# Patient Record
Sex: FEMALE | Race: WHITE | NOT HISPANIC OR LATINO | Employment: UNEMPLOYED | URBAN - METROPOLITAN AREA
[De-identification: names, ages, dates, MRNs, and addresses within clinical notes are randomized per-mention and may not be internally consistent; named-entity substitution may affect disease eponyms.]

---
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
## 2021-01-02 ENCOUNTER — NURSE TRIAGE (OUTPATIENT)
Dept: OTHER | Facility: OTHER | Age: 21
End: 2021-01-02

## 2021-01-02 DIAGNOSIS — Z20.828 SARS-ASSOCIATED CORONAVIRUS EXPOSURE: Primary | ICD-10-CM

## 2021-01-02 DIAGNOSIS — Z20.828 SARS-ASSOCIATED CORONAVIRUS EXPOSURE: ICD-10-CM

## 2021-01-02 PROCEDURE — U0003 INFECTIOUS AGENT DETECTION BY NUCLEIC ACID (DNA OR RNA); SEVERE ACUTE RESPIRATORY SYNDROME CORONAVIRUS 2 (SARS-COV-2) (CORONAVIRUS DISEASE [COVID-19]), AMPLIFIED PROBE TECHNIQUE, MAKING USE OF HIGH THROUGHPUT TECHNOLOGIES AS DESCRIBED BY CMS-2020-01-R: HCPCS | Performed by: FAMILY MEDICINE

## 2021-01-02 NOTE — TELEPHONE ENCOUNTER
Reason for Disposition   [1] COVID-19 infection suspected by caller or triager AND [2] mild symptoms (cough, fever, or others) AND [7] no complications or SOB    Answer Assessment - Initial Assessment Questions  1  COVID-19 DIAGNOSIS: "Who made your Coronavirus (COVID-19) diagnosis?" "Was it confirmed by a positive lab test?" If not diagnosed by a HCP, ask "Are there lots of cases (community spread) where you live?" (See public health department website, if unsure)      Not diagnosed  2  COVID-19 EXPOSURE: "Was there any known exposure to COVID before the symptoms began?" [facility] Definition of close contact: within 6 feet (2 meters) for a total of 15 minutes or more over a 24-hour period  Yes  3  ONSET: "When did the COVID-19 symptoms start?"       3 days ago  4  WORST SYMPTOM: "What is your worst symptom?" (e g , cough, fever, shortness of breath, muscle aches)      Loss of taste and smell  5  COUGH: "Do you have a cough?" If so, ask: "How bad is the cough?"        Yes  6  FEVER: "Do you have a fever?" If so, ask: "What is your temperature, how was it measured, and when did it start?"      No  7  RESPIRATORY STATUS: "Describe your breathing?" (e g , shortness of breath, wheezing, unable to speak)       Normal  8  BETTER-SAME-WORSE: "Are you getting better, staying the same or getting worse compared to yesterday?"  If getting worse, ask, "In what way?"      Same  9  HIGH RISK DISEASE: "Do you have any chronic medical problems?" (e g , asthma, heart or lung disease, weak immune system, obesity, etc )      No  10  PREGNANCY: "Is there any chance you are pregnant?" "When was your last menstrual period?"        No  11   OTHER SYMPTOMS: "Do you have any other symptoms?"  (e g , chills, fatigue, headache, loss of smell or taste, muscle pain, sore throat; new loss of smell or taste especially support the diagnosis of COVID-19)        Stuffy nose    Protocols used: CORONAVIRUS (COVID-19) DIAGNOSED OR SUSPECTED-ADULT-AH

## 2021-01-02 NOTE — TELEPHONE ENCOUNTER
Regarding: COVID symptomatic, exposure   ----- Message from [facility] sent at 1/2/2021 10:25 AM EST -----  Lost taste and smell, stuffy nose  Exposed to someone that has Tiny

## 2021-01-05 ENCOUNTER — TELEPHONE (OUTPATIENT)
Dept: UROLOGY | Facility: CLINIC | Age: 21
End: 2021-01-05

## 2021-01-05 ENCOUNTER — TELEPHONE (OUTPATIENT)
Dept: OTHER | Facility: OTHER | Age: 21
End: 2021-01-05

## 2021-01-05 LAB — SARS-COV-2 RNA SPEC QL NAA+PROBE: DETECTED

## 2021-01-05 NOTE — TELEPHONE ENCOUNTER
Attempt 1:    The patient was called for notification of a test result for COVID-19  The patient did not answer the phone and a voicemail was left requesting a call back to [phone], Option 7  POD 3 after C4IMA  doing well  pt in SR, on amio  cont ASA, bb  cont diuresis (negative 600cc/24hrs)  start SQH  Cont pulmonary toilet, Chest PT, pain control, Incentive spirometry  OOB ambulate  case d/w CTU team

## 2021-01-05 NOTE — TELEPHONE ENCOUNTER
The patient was called for notification of a test result for COVID-19  The patient did not answer the phone and a voicemail was left requesting a call back to [phone], Option 7

## 2021-01-07 NOTE — TELEPHONE ENCOUNTER
3rd attempt  My has an active MyChart  Pt removed from results queue  The patient was called for notification of a test result for COVID-19  The patient did not answer the phone and a voicemail was left requesting a call back to [phone], Option 7

## 2021-01-10 ENCOUNTER — DOCUMENTATION (OUTPATIENT)
Dept: URGENT CARE | Facility: CLINIC | Age: 21
End: 2021-01-10

## 2021-01-10 NOTE — PROGRESS NOTES
Patient called requesting note for work status post coronavirus infection  Patient has been asymptomatic for over 3 days  She has past 10 day quarantine based on CDC guidelines  She is able to go back to work  Note will be entered into her my chart for her

## 2021-01-10 NOTE — LETTER
January 10, 2021         Patient: Ele Galeano   YOB: 2000              Ele Galeano is able to proceed back to work on 01/11/2020  If you have any questions or concerns, please don't hesitate to call             Sincerely,        Javi Reynolds PA-C

## 2021-04-06 ENCOUNTER — IMMUNIZATIONS (OUTPATIENT)
Dept: FAMILY MEDICINE CLINIC | Facility: HOSPITAL | Age: 21
End: 2021-04-06

## 2021-04-06 DIAGNOSIS — Z23 ENCOUNTER FOR IMMUNIZATION: Primary | ICD-10-CM

## 2021-04-06 PROCEDURE — 91300 SARS-COV-2 / COVID-19 MRNA VACCINE (PFIZER-BIONTECH) 30 MCG: CPT

## 2021-04-06 PROCEDURE — 0001A SARS-COV-2 / COVID-19 MRNA VACCINE (PFIZER-BIONTECH) 30 MCG: CPT

## 2021-04-27 ENCOUNTER — IMMUNIZATIONS (OUTPATIENT)
Dept: FAMILY MEDICINE CLINIC | Facility: HOSPITAL | Age: 21
End: 2021-04-27

## 2021-04-27 DIAGNOSIS — Z23 ENCOUNTER FOR IMMUNIZATION: Primary | ICD-10-CM

## 2021-04-27 PROCEDURE — 0002A SARS-COV-2 / COVID-19 MRNA VACCINE (PFIZER-BIONTECH) 30 MCG: CPT

## 2021-04-27 PROCEDURE — 91300 SARS-COV-2 / COVID-19 MRNA VACCINE (PFIZER-BIONTECH) 30 MCG: CPT

## 2021-04-30 ENCOUNTER — OFFICE VISIT (OUTPATIENT)
Dept: FAMILY MEDICINE CLINIC | Facility: CLINIC | Age: 21
End: 2021-04-30
Payer: COMMERCIAL

## 2021-04-30 ENCOUNTER — APPOINTMENT (OUTPATIENT)
Dept: LAB | Facility: CLINIC | Age: 21
End: 2021-04-30
Payer: COMMERCIAL

## 2021-04-30 ENCOUNTER — TRANSCRIBE ORDERS (OUTPATIENT)
Dept: LAB | Facility: CLINIC | Age: 21
End: 2021-04-30

## 2021-04-30 VITALS
HEART RATE: 90 BPM | DIASTOLIC BLOOD PRESSURE: 80 MMHG | TEMPERATURE: 98.3 F | WEIGHT: 135 LBS | BODY MASS INDEX: 24.84 KG/M2 | OXYGEN SATURATION: 98 % | HEIGHT: 62 IN | SYSTOLIC BLOOD PRESSURE: 118 MMHG

## 2021-04-30 DIAGNOSIS — Z00.00 ANNUAL PHYSICAL EXAM: Primary | ICD-10-CM

## 2021-04-30 DIAGNOSIS — Z11.1 SCREENING FOR TUBERCULOSIS: ICD-10-CM

## 2021-04-30 DIAGNOSIS — Z01.84 IMMUNITY STATUS TESTING: ICD-10-CM

## 2021-04-30 LAB
HBV SURFACE AB SER-ACNC: 41.16 MIU/ML
RUBV IGG SERPL IA-ACNC: 22.7 IU/ML

## 2021-04-30 PROCEDURE — 86735 MUMPS ANTIBODY: CPT

## 2021-04-30 PROCEDURE — 36415 COLL VENOUS BLD VENIPUNCTURE: CPT

## 2021-04-30 PROCEDURE — 86787 VARICELLA-ZOSTER ANTIBODY: CPT

## 2021-04-30 PROCEDURE — 99385 PREV VISIT NEW AGE 18-39: CPT | Performed by: FAMILY MEDICINE

## 2021-04-30 PROCEDURE — 86765 RUBEOLA ANTIBODY: CPT

## 2021-04-30 PROCEDURE — 3725F SCREEN DEPRESSION PERFORMED: CPT | Performed by: FAMILY MEDICINE

## 2021-04-30 PROCEDURE — 86580 TB INTRADERMAL TEST: CPT | Performed by: FAMILY MEDICINE

## 2021-04-30 PROCEDURE — 3008F BODY MASS INDEX DOCD: CPT | Performed by: FAMILY MEDICINE

## 2021-04-30 PROCEDURE — 86706 HEP B SURFACE ANTIBODY: CPT

## 2021-04-30 PROCEDURE — 86762 RUBELLA ANTIBODY: CPT

## 2021-04-30 NOTE — ASSESSMENT & PLAN NOTE
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
## 2021-05-01 LAB — VZV IGM SER IA-ACNC: <0.91 INDEX (ref 0–0.9)

## 2021-05-03 ENCOUNTER — CLINICAL SUPPORT (OUTPATIENT)
Dept: FAMILY MEDICINE CLINIC | Facility: CLINIC | Age: 21
End: 2021-05-03

## 2021-05-03 DIAGNOSIS — Z11.1 SCREENING FOR TUBERCULOSIS: Primary | ICD-10-CM

## 2021-05-03 LAB
INDURATION: 0 MM
TB SKIN TEST: NEGATIVE

## 2021-05-04 LAB
MEV IGG SER QL: NORMAL
MUV IGG SER QL: ABNORMAL
VZV IGG SER IA-ACNC: NORMAL

## 2021-05-06 PROBLEM — Z11.3 SCREEN FOR STD (SEXUALLY TRANSMITTED DISEASE): Status: RESOLVED | Noted: 2020-01-14 | Resolved: 2021-05-06

## 2021-05-06 NOTE — PATIENT INSTRUCTIONS
Safe Sex Practices   WHAT YOU NEED TO KNOW:   What are safe sex practices? Safe sex practices are ways to prevent pregnancy and the spread of sexually transmitted infections (STIs)  An STI happens when a virus or bacteria are spread through sexual activity  Safe sex practices help decrease or prevent body fluid exchange during sex  Body fluids include saliva, urine, blood, vaginal fluids, and semen  Oral, vaginal, and anal sex can all spread STIs  What are some safe sex practices to follow before I have sex? · Talk to a new partner before you have sex  Tell your partner if you have an STI  Ask about his or her sex history and if he or she has a current or past STI  Your partner may need to be tested and treated  Do not have sex while you are being treated for an STI, or with a partner who is being treated  · Limit your number of sex partners  More than one sex partner can increase your risk for an STI  Do not have sex with anyone whose sex history you do not know  · Get tested for STIs if needed  Get tested if you had sex with someone who has an STI  Get tested if you have unprotected sex with any new partner  · Talk to your healthcare provider about birth control  Birth control can help prevent an unwanted pregnancy  There are many different types of birth control  Talk to your healthcare provider about which birth control method is right for you  · Ask about medicines to lower your risk for some STIs:      ? Vaccines  can help protect you from hepatitis A, hepatitis B, and the human papillomavirus (HPV)  The HPV vaccine is usually given at 11 years, but it may be given through 26 years to both females and males  Your provider can give you more information on vaccines to prevent STIs  ? Pre-exposure prophylaxis (PrEP)  may be given if you are at high risk for HIV  PrEP is taken every day to prevent the virus from fully infecting the body  · Do not use alcohol or drugs before sex    These can prevent you from thinking clearly and increase your risk for unsafe sex  What are some safe sex practices to follow while I am having sex? · Use condoms and barrier methods for all types of sexual contact  This includes oral, vaginal, and anal sex  Male and female condoms are available  Make sure that the condom fits and is put on correctly  Rubber latex sheets or dental dams can be used for oral sex  Use a new condom or latex barrier each time you have sex  Use latex condoms, if possible  Lambskin or natural condoms do not prevent STIs  If you or your partner is allergic to latex, use a nonlatex product, such as polyurethane  Use a second form of birth control with the condom to prevent pregnancy and STIs  Do not use male and female condoms together  · Only use water-based lubricants during sex  Water-based lubricants help prevent sores or cuts in the vagina or on the penis  Prevent sores or cuts to decrease your risk for an STI  Do not use oil-based lubricants, such as baby oil or hand lotion, with latex condoms or barriers  These will weaken the latex and may cause the condom to break  · Do not use chemicals that irritate your skin  Products that contain chemical irritants, such as spermicides, can irritate the lining of your vagina or rectum  Irritation may cause sores that can increase your risk for an STI  · Be careful when you have sex if you have open sores or cuts  Open sores or cuts may increase your risk for an STI  Keep all open sores or cuts covered during sex  Do not have oral sex if you have cuts or sores in your mouth  · Do not do activities that can pass germs  Do not use saliva as a lubricant or share sex toys  Where can I find more information? · [id number] [address]y (VENU)  P O  [address] [facility] , [address] [place] [address]  Web Address: [email or web address] [email or web address]/  org    When should I seek immediate care?    · A condom breaks, leaks, or slips off while you are having sex  · You notice sores on your penis, vagina, anal area, or the skin around them  · You have had unsafe sex and want to discuss emergency contraception or treatment for STI exposure  When should I call my doctor? · You think you or your female sex partner might be pregnant  · You have questions or concerns about your condition or care  CARE AGREEMENT:   You have the right to help plan your care  Learn about your health condition and how it may be treated  Discuss treatment options with your healthcare providers to decide what care you want to receive  You always have the right to refuse treatment  The above information is an  only  It is not intended as medical advice for individual conditions or treatments  Talk to your doctor, nurse or pharmacist before following any medical regimen to see if it is safe and effective for you  © Copyright [address] Information is for End User's use only and may not be sold, redistributed or otherwise used for commercial purposes   All illustrations and images included in CareNotes® are the copyrighted property of [employer]  or [employer]

## 2021-05-06 NOTE — PROGRESS NOTES
Assessment/Plan:      Diagnoses and all orders for this visit:    Unprotected sex    Routine screening for STI (sexually transmitted infection)      -reviewed safe sexual practices including consistent condom use  -reviewed all forms of contraception including LARCs  Patient is not interested in a forms of contraception  Is okay with pregnancy  -encouraged to avoid alcohol, tobacco/nicotine and drug use  Encouraged not to drink and drive or drive with others that have been drinking  -will call with results    RTO 3/2022 for annual exam with 1st Pap smear  Subjective:     Patient ID: Shira Barron is a 22 y o  female  HPI G0 here requesting STI testing  LMP 4/20/21  Menses are monthly  lasting 5 days  Is sexually active using condoms inconsistently  for contraception    Denies vaginal discharge, pelvic pain, fever and chills  Admits to new  Partner  Medical history significant for heart murmur and eye surgery  Patient is a nonsmoker    Had Gardasil vaccine  Pap smears to begin at age 25  Last gonorrhea/chlamydia 1/14/20 negative    Review of Systems   Constitutional: Negative for chills, fatigue and fever  Respiratory: Negative  Cardiovascular: Negative  Genitourinary: Negative  Neurological: Negative for headaches  Objective:  /83   Pulse 93   Ht 5' 1 5" (1 562 m)   Wt 61 7 kg (136 lb)   LMP 04/20/2021   Breastfeeding No   BMI 25 28 kg/m²        Physical Exam  Vitals signs reviewed  Constitutional:       Appearance: Normal appearance  Cardiovascular:      Rate and Rhythm: Normal rate and regular rhythm  Heart sounds: Murmur present  Pulmonary:      Effort: Pulmonary effort is normal       Breath sounds: Normal breath sounds  Neurological:      Mental Status: She is alert and oriented to person, place, and time     Psychiatric:         Mood and Affect: Mood normal          Behavior: Behavior normal

## 2021-05-10 ENCOUNTER — OFFICE VISIT (OUTPATIENT)
Dept: OBGYN CLINIC | Facility: CLINIC | Age: 21
End: 2021-05-10

## 2021-05-10 ENCOUNTER — CLINICAL SUPPORT (OUTPATIENT)
Dept: FAMILY MEDICINE CLINIC | Facility: CLINIC | Age: 21
End: 2021-05-10
Payer: COMMERCIAL

## 2021-05-10 VITALS
BODY MASS INDEX: 25.03 KG/M2 | SYSTOLIC BLOOD PRESSURE: 130 MMHG | WEIGHT: 136 LBS | HEART RATE: 93 BPM | DIASTOLIC BLOOD PRESSURE: 83 MMHG | HEIGHT: 62 IN

## 2021-05-10 DIAGNOSIS — Z72.51 UNPROTECTED SEX: ICD-10-CM

## 2021-05-10 DIAGNOSIS — Z11.3 ROUTINE SCREENING FOR STI (SEXUALLY TRANSMITTED INFECTION): Primary | ICD-10-CM

## 2021-05-10 DIAGNOSIS — Z11.1 SCREENING FOR TUBERCULOSIS: Primary | ICD-10-CM

## 2021-05-10 PROBLEM — Z00.00 ANNUAL PHYSICAL EXAM: Status: RESOLVED | Noted: 2021-04-30 | Resolved: 2021-05-10

## 2021-05-10 PROBLEM — Z30.42 ENCOUNTER FOR MANAGEMENT AND INJECTION OF DEPO-PROVERA: Status: RESOLVED | Noted: 2019-01-21 | Resolved: 2021-05-10

## 2021-05-10 PROCEDURE — 87591 N.GONORRHOEAE DNA AMP PROB: CPT | Performed by: NURSE PRACTITIONER

## 2021-05-10 PROCEDURE — 99212 OFFICE O/P EST SF 10 MIN: CPT | Performed by: NURSE PRACTITIONER

## 2021-05-10 PROCEDURE — 3008F BODY MASS INDEX DOCD: CPT | Performed by: FAMILY MEDICINE

## 2021-05-10 PROCEDURE — 3008F BODY MASS INDEX DOCD: CPT | Performed by: NURSE PRACTITIONER

## 2021-05-10 PROCEDURE — 86580 TB INTRADERMAL TEST: CPT

## 2021-05-10 PROCEDURE — 87491 CHLMYD TRACH DNA AMP PROBE: CPT | Performed by: NURSE PRACTITIONER

## 2021-05-10 PROCEDURE — 1036F TOBACCO NON-USER: CPT | Performed by: NURSE PRACTITIONER

## 2021-05-11 ENCOUNTER — TELEPHONE (OUTPATIENT)
Dept: OBGYN CLINIC | Facility: CLINIC | Age: 21
End: 2021-05-11

## 2021-05-11 DIAGNOSIS — A74.9 CHLAMYDIA INFECTION: Primary | ICD-10-CM

## 2021-05-11 LAB
C TRACH DNA SPEC QL NAA+PROBE: POSITIVE
N GONORRHOEA DNA SPEC QL NAA+PROBE: NEGATIVE

## 2021-05-11 RX ORDER — AZITHROMYCIN 500 MG/1
1000 TABLET, FILM COATED ORAL DAILY
Qty: 2 TABLET | Refills: 0 | Status: SHIPPED | OUTPATIENT
Start: 2021-05-11 | End: 2021-05-12

## 2021-05-11 NOTE — TELEPHONE ENCOUNTER
----- Message from Guidefitter sent at 5/11/2021  7:39 AM EDT -----  Please call patient gonorrhea is negative  Chlamydia is positive  Recommend treatment with azithromycin 1g  Patient to call office if she is unable to tolerate medication  Should abstain from intercourse for 7 days after both her and her partner have been treated  She will need a follow up in 3 months       Thank you

## 2021-05-11 NOTE — TELEPHONE ENCOUNTER
Pt called stating she took her medication on an empty stomach and threw up a hour later  Would she need another dose ?

## 2021-05-13 ENCOUNTER — CLINICAL SUPPORT (OUTPATIENT)
Dept: FAMILY MEDICINE CLINIC | Facility: CLINIC | Age: 21
End: 2021-05-13
Payer: COMMERCIAL

## 2021-05-13 DIAGNOSIS — Z23 ENCOUNTER FOR IMMUNIZATION: Primary | ICD-10-CM

## 2021-05-13 DIAGNOSIS — Z11.1 SCREENING FOR TUBERCULOSIS: ICD-10-CM

## 2021-05-13 LAB
INDURATION: 0 MM
TB SKIN TEST: NEGATIVE

## 2021-05-13 PROCEDURE — 90707 MMR VACCINE SC: CPT

## 2021-05-13 PROCEDURE — 90471 IMMUNIZATION ADMIN: CPT

## 2021-08-10 ENCOUNTER — OFFICE VISIT (OUTPATIENT)
Dept: OBGYN CLINIC | Facility: CLINIC | Age: 21
End: 2021-08-10

## 2021-08-10 VITALS
DIASTOLIC BLOOD PRESSURE: 76 MMHG | WEIGHT: 136 LBS | SYSTOLIC BLOOD PRESSURE: 125 MMHG | BODY MASS INDEX: 25.03 KG/M2 | HEART RATE: 78 BPM | HEIGHT: 62 IN

## 2021-08-10 DIAGNOSIS — A74.9 CHLAMYDIA INFECTION: Primary | ICD-10-CM

## 2021-08-10 PROCEDURE — 87491 CHLMYD TRACH DNA AMP PROBE: CPT | Performed by: FAMILY MEDICINE

## 2021-08-10 PROCEDURE — 87591 N.GONORRHOEAE DNA AMP PROB: CPT | Performed by: FAMILY MEDICINE

## 2021-08-10 PROCEDURE — 99213 OFFICE O/P EST LOW 20 MIN: CPT | Performed by: FAMILY MEDICINE
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
## 2021-08-11 LAB
C TRACH DNA SPEC QL NAA+PROBE: NEGATIVE
N GONORRHOEA DNA SPEC QL NAA+PROBE: NEGATIVE

## 2021-10-12 ENCOUNTER — TELEPHONE (OUTPATIENT)
Dept: OTHER | Facility: OTHER | Age: 21
End: 2021-10-12

## 2021-10-12 ENCOUNTER — TELEMEDICINE (OUTPATIENT)
Dept: FAMILY MEDICINE CLINIC | Facility: CLINIC | Age: 21
End: 2021-10-12
Payer: COMMERCIAL

## 2021-10-12 DIAGNOSIS — Z20.822 EXPOSURE TO COVID-19 VIRUS: Primary | ICD-10-CM

## 2021-10-12 PROCEDURE — 99212 OFFICE O/P EST SF 10 MIN: CPT | Performed by: FAMILY MEDICINE

## 2021-10-13 PROCEDURE — U0003 INFECTIOUS AGENT DETECTION BY NUCLEIC ACID (DNA OR RNA); SEVERE ACUTE RESPIRATORY SYNDROME CORONAVIRUS 2 (SARS-COV-2) (CORONAVIRUS DISEASE [COVID-19]), AMPLIFIED PROBE TECHNIQUE, MAKING USE OF HIGH THROUGHPUT TECHNOLOGIES AS DESCRIBED BY CMS-2020-01-R: HCPCS | Performed by: FAMILY MEDICINE

## 2021-10-13 PROCEDURE — U0005 INFEC AGEN DETEC AMPLI PROBE: HCPCS | Performed by: FAMILY MEDICINE

## 2021-12-17 ENCOUNTER — IMMUNIZATIONS (OUTPATIENT)
Dept: FAMILY MEDICINE CLINIC | Facility: HOSPITAL | Age: 21
End: 2021-12-17

## 2021-12-17 DIAGNOSIS — Z23 ENCOUNTER FOR IMMUNIZATION: Primary | ICD-10-CM

## 2021-12-17 PROCEDURE — 91306 COVID-19 MODERNA VACC 0.25 ML BOOSTER: CPT

## 2021-12-17 PROCEDURE — 0064A COVID-19 MODERNA VACC 0.25 ML BOOSTER: CPT

## 2021-12-25 ENCOUNTER — HOSPITAL ENCOUNTER (EMERGENCY)
Facility: HOSPITAL | Age: 21
Discharge: HOME | End: 2021-12-25
Attending: EMERGENCY MEDICINE
Payer: COMMERCIAL

## 2021-12-25 ENCOUNTER — APPOINTMENT (EMERGENCY)
Dept: RADIOLOGY | Facility: HOSPITAL | Age: 21
End: 2021-12-25
Payer: COMMERCIAL

## 2021-12-25 VITALS
DIASTOLIC BLOOD PRESSURE: 96 MMHG | RESPIRATION RATE: 18 BRPM | BODY MASS INDEX: 25.03 KG/M2 | HEART RATE: 86 BPM | TEMPERATURE: 98.6 F | SYSTOLIC BLOOD PRESSURE: 164 MMHG | HEIGHT: 62 IN | OXYGEN SATURATION: 97 % | WEIGHT: 136 LBS

## 2021-12-25 DIAGNOSIS — M54.2 NECK PAIN: ICD-10-CM
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
## 2021-12-25 DIAGNOSIS — M54.9 BACK PAIN: ICD-10-CM

## 2021-12-25 PROCEDURE — 99284 EMERGENCY DEPT VISIT MOD MDM: CPT | Performed by: PHYSICIAN ASSISTANT

## 2021-12-25 PROCEDURE — 72040 X-RAY EXAM NECK SPINE 2-3 VW: CPT

## 2021-12-25 PROCEDURE — 72100 X-RAY EXAM L-S SPINE 2/3 VWS: CPT

## 2021-12-25 PROCEDURE — 99284 EMERGENCY DEPT VISIT MOD MDM: CPT

## 2021-12-25 RX ORDER — NAPROXEN 250 MG/1
500 TABLET ORAL ONCE
Status: COMPLETED | OUTPATIENT
Start: 2021-12-25 | End: 2021-12-25

## 2021-12-25 RX ORDER — NAPROXEN 500 MG/1
500 TABLET ORAL 2 TIMES DAILY WITH MEALS
Qty: 30 TABLET | Refills: 0 | Status: SHIPPED | OUTPATIENT
Start: 2021-12-25

## 2021-12-25 RX ORDER — METHOCARBAMOL 500 MG/1
500 TABLET, FILM COATED ORAL 2 TIMES DAILY
Qty: 20 TABLET | Refills: 0 | Status: SHIPPED | OUTPATIENT
Start: 2021-12-25

## 2021-12-25 RX ORDER — ACETAMINOPHEN 325 MG/1
650 TABLET ORAL ONCE
Status: COMPLETED | OUTPATIENT
Start: 2021-12-25 | End: 2021-12-25

## 2021-12-25 RX ORDER — METHOCARBAMOL 500 MG/1
500 TABLET, FILM COATED ORAL ONCE
Status: COMPLETED | OUTPATIENT
Start: 2021-12-25 | End: 2021-12-25

## 2021-12-25 RX ADMIN — ACETAMINOPHEN 650 MG: 325 TABLET, FILM COATED ORAL at 19:39

## 2021-12-25 RX ADMIN — METHOCARBAMOL 500 MG: 500 TABLET ORAL at 19:38

## 2021-12-25 RX ADMIN — NAPROXEN 500 MG: 250 TABLET ORAL at 19:39
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
## 2022-01-17 NOTE — PROGRESS NOTES
Assessment      Very low risk of STD exposure    Declines contraception, OK if she got pregnant, recommend daily multivitamin with folic acid    Plan   RTO 3/28/2022 for annual and 1st pap smear  Discussed safe sexual practice in detail   Culture for GC and cT sent  Pt to complete HIV, RPR and Hep b sag, will call results    Subjective    Kamila Gambino is a 22 y o  female who presents for sexually transmitted disease check  Sexual history reviewed with the patient  STI Exposure: denies knowledge of risky exposure  She is with the same partner, just wants to be checked  Previous history of STI chlamydia  Current symptoms none  Her last culture for [facility] and [facility] was 8/10/2021 and was negative  Hx of positive chlamydia 5/10/2021  Contraception: none, OK if she got pregnant  Menstrual History:  OB History        1    Para        Term                AB   1    Living           SAB        IAB   1    Ectopic        Multiple        Live Births                      Patient's last menstrual period was 2022  Period Cycle (Days): 30  Period Duration (Days): 3-4  Period Pattern: Regular  Dysmenorrhea: (!) Mild    The following portions of the patient's history were reviewed and updated as appropriate: allergies, current medications, past family history, past medical history, past social history, past surgical history and problem list     Review of Systems  Pertinent items are noted in HPI          Objective      /81 (BP Location: Left arm, Patient Position: Sitting, Cuff Size: Adult)   Pulse 88   Ht 5' 2" (1 575 m)   Wt 62 6 kg (138 lb)   LMP 2022   BMI 25 24 kg/m²     General:   alert and oriented, in no acute distress   Heart:    Lungs:    Abdomen: soft, non-tender, without masses or organomegaly, soft and nontender   Vulva: Pelvic exam Deferred denies symptoms   Vagina:    Cervix:    Uterus:    Adnexa:    Lymph Nodes:     Cultures: GC and Chlamydia genprobes via urine

## 2022-01-18 ENCOUNTER — OFFICE VISIT (OUTPATIENT)
Dept: OBGYN CLINIC | Facility: CLINIC | Age: 22
End: 2022-01-18

## 2022-01-18 VITALS
HEIGHT: 62 IN | BODY MASS INDEX: 25.4 KG/M2 | HEART RATE: 88 BPM | DIASTOLIC BLOOD PRESSURE: 81 MMHG | SYSTOLIC BLOOD PRESSURE: 125 MMHG | WEIGHT: 138 LBS

## 2022-01-18 DIAGNOSIS — Z20.2 POSSIBLE EXPOSURE TO STD: Primary | ICD-10-CM

## 2022-01-18 PROCEDURE — 99213 OFFICE O/P EST LOW 20 MIN: CPT | Performed by: NURSE PRACTITIONER

## 2022-01-18 PROCEDURE — 87491 CHLMYD TRACH DNA AMP PROBE: CPT | Performed by: NURSE PRACTITIONER

## 2022-01-18 PROCEDURE — 87591 N.GONORRHOEAE DNA AMP PROB: CPT | Performed by: NURSE PRACTITIONER

## 2022-01-20 ENCOUNTER — TELEPHONE (OUTPATIENT)
Dept: OBGYN CLINIC | Facility: CLINIC | Age: 22
End: 2022-01-20

## 2022-01-20 LAB
C TRACH DNA SPEC QL NAA+PROBE: NEGATIVE
N GONORRHOEA DNA SPEC QL NAA+PROBE: NEGATIVE

## 2022-01-20 NOTE — TELEPHONE ENCOUNTER
Message left for patient that results are available in [facility]  Encouraged to call office with any questions/concerns

## 2022-01-20 NOTE — TELEPHONE ENCOUNTER
----- Message from Ashley Valdes, [address] sent at 1/20/2022  8:18 AM EST -----   Culture Result is negative, please call patient to inform    Thanks

## 2022-03-28 ENCOUNTER — TELEPHONE (OUTPATIENT)
Dept: OBGYN CLINIC | Facility: CLINIC | Age: 22
End: 2022-03-28

## 2022-04-19 ENCOUNTER — ANNUAL EXAM (OUTPATIENT)
Dept: OBGYN CLINIC | Facility: CLINIC | Age: 22
End: 2022-04-19

## 2022-04-19 VITALS
DIASTOLIC BLOOD PRESSURE: 80 MMHG | WEIGHT: 134.2 LBS | BODY MASS INDEX: 24.69 KG/M2 | SYSTOLIC BLOOD PRESSURE: 128 MMHG | HEART RATE: 83 BPM | HEIGHT: 62 IN

## 2022-04-19 DIAGNOSIS — Z11.3 SCREENING FOR STDS (SEXUALLY TRANSMITTED DISEASES): ICD-10-CM

## 2022-04-19 DIAGNOSIS — Z01.419 ENCOUNTER FOR GYNECOLOGICAL EXAMINATION WITHOUT ABNORMAL FINDING: Primary | ICD-10-CM

## 2022-04-19 PROCEDURE — 1036F TOBACCO NON-USER: CPT | Performed by: NURSE PRACTITIONER

## 2022-04-19 PROCEDURE — 3008F BODY MASS INDEX DOCD: CPT | Performed by: NURSE PRACTITIONER

## 2022-04-19 PROCEDURE — 0503F POSTPARTUM CARE VISIT: CPT | Performed by: NURSE PRACTITIONER

## 2022-04-19 PROCEDURE — 99395 PREV VISIT EST AGE 18-39: CPT | Performed by: NURSE PRACTITIONER

## 2022-04-19 PROCEDURE — 87591 N.GONORRHOEAE DNA AMP PROB: CPT | Performed by: NURSE PRACTITIONER

## 2022-04-19 PROCEDURE — 87491 CHLMYD TRACH DNA AMP PROBE: CPT | Performed by: NURSE PRACTITIONER

## 2022-04-19 PROCEDURE — G0145 SCR C/V CYTO,THINLAYER,RESCR: HCPCS | Performed by: NURSE PRACTITIONER

## 2022-04-19 NOTE — PROGRESS NOTES
ASSESSMENT & PLAN: Mickey Soto is a 25 y o  [place] Kaity with normal gynecologic exam     1   Routine well woman exam done today  2  Pap:  The patient's last pap was 1st Pap to be done today  Pap was done today  Current ASCCP Guidelines reviewed  3   STD testing - culture for chlamydia and gonorrhea done today declines other testing  4  Gardasil recommendations reviewed  is vaccinated  5  The following were reviewed in today's visit: breast self exam, STD testing, HIV risk factors and prevention, family planning choices, adequate intake of calcium and vitamin D, exercise and healthy diet  She has STD blood work that she needs to complete  6  RTO if desires contraception  Reviewed daily multivitamin with folic acid  Depression Screening Follow-up Plan: Patient's depression screening was positive with a PHQ-2 score of   Their PHQ-9 score was   Clinically patient does not have depression  No treatment is required  CC:  Annual Gynecologic Examination    HPI: Mickey Soto is a 25 y o  [place] Kaity who presents for annual gynecologic examination  She is due for her 1st Pap today  She has had Gardasil vaccine  Her last culture for chlamydia and gonorrhea was done 2022 and was negative  She does have history of positive chlamydia test 5/10/2021  Wants culture for GC and cT today  Health Maintenance:    She wears her seatbelt routinely  She does perform irregular monthly self breast exams  She feels safe at home  Past Medical History:   Diagnosis Date    Heart murmur        Past Surgical History:   Procedure Laterality Date    EYE SURGERY      stye removal       OB/Gyn History:    Pt does not have menstrual issues  regular    History of sexually transmitted infection: Yes  History of abnormal pap smears: No   1st pap done today       Patient is currently sexually active      The current method of family planning is condoms    OB History        1    Para        Term         AB   1    Living           SAB        IAB   1    Ectopic        Multiple        Live Births                     Family History   Problem Relation Age of Onset    No Known Problems Mother     No Known Problems Father     No Known Problems Sister     No Known Problems Brother     No Known Problems Maternal Grandmother     No Known Problems Maternal Grandfather     No Known Problems Paternal Grandmother     No Known Problems Paternal Grandfather     Diabetes Sister     No Known Problems Brother     Asthma Brother     Breast cancer Neg Hx     Colon cancer Neg Hx     Ovarian cancer Neg Hx        Social History:  Social History     Socioeconomic History    Marital status: Single     Spouse name: Not on file    Number of children: Not on file    Years of education: Not on file    Highest education level: Not on file   Occupational History    Not on file   Tobacco Use    Smoking status: Never Smoker    Smokeless tobacco: Never Used   Vaping Use    Vaping Use: Never used   Substance and Sexual Activity    Alcohol use: Not Currently    Drug use: No    Sexual activity: Yes     Birth control/protection: None     Comment: pt cell phone number is [phone]   Other Topics Concern    Not on file   Social History Narrative    Not on file     Social Determinants of Health     Financial Resource Strain: Not on file   Food Insecurity: Not on file   Transportation Needs: Not on file   Physical Activity: Not on file   Stress: Not on file   Social Connections: Not on file   Intimate Partner Violence: Not on file   Housing Stability: Not on file     Patient is single    Patient is currently employed  In [employer] school    Allergies   Allergen Reactions    Shrimp (Diagnostic) - Food Allergy Lip Swelling         Current Outpatient Medications:     methocarbamol (ROBAXIN) 500 mg tablet, Take 1 tablet (500 mg total) by mouth 2 (two) times a day (Patient not taking: Reported on 2022 ), Disp: 20 tablet, Rfl: 0    naproxen (Naprosyn) 500 mg tablet, Take 1 tablet (500 mg total) by mouth 2 (two) times a day with meals (Patient not taking: Reported on 1/18/2022 ), Disp: 30 tablet, Rfl: 0    Current Facility-Administered Medications:     EPINEPHrine (EPIPEN) injection 0 3 mg, 0 3 mg, Intramuscular, Once, Jermaine Coyne PA-C    Review of Systems:  Constitutional :no fever, feels well, no tiredness, no recent weight gain or loss  ENT: no ear ache, no loss of hearing, no nosebleeds or nasal discharge, no sore throat or hoarseness  Cardiovascular: no complaints of slow or fast heart beat, no chest pain, no palpitations, no leg claudication or lower extremity edema  Respiratory: no complaints of shortness of shortness of breath, no TERAN  Breasts:no complaints of breast pain, breast lump, or nipple discharge  Gastrointestinal: no complaints of abdominal pain, constipation, nausea, vomiting, or diarrhea or bloody stools  Genitourinary : no complaints of dysuria, incontinence, pelvic pain, no dysmenorrhea, vaginal discharge or abnormal vaginal bleeding and as noted in HPI  Musculoskeletal: no complaints of arthralgia, no myalgia, no joint swelling or stiffness, no limb pain or swelling  Integumentary: no complaints of skin rash or lesion, itching or dry skin  Neurological: no complaints of headache, no confusion, no numbness or tingling, no dizziness or fainting    Objective      There were no vitals taken for this visit      General:   appears stated age, cooperative, alert normal mood and affect   Neck: normal, supple,trachea midline, no masses   Heart: regular rate and rhythm, S1, S2 normal, no murmur, click, rub or gallop   Lungs: clear to auscultation bilaterally   Breasts: normal appearance, no masses or tenderness, Inspection negative, No nipple retraction or dimpling, No nipple discharge or bleeding, No axillary or supraclavicular adenopathy, Normal to palpation without dominant masses, Taught monthly breast self examination, bilateral nipple piercing  Abdomen: soft, non-tender, without masses or organomegaly   Vulva: normal female genitalia, Bartholin's, Urethra, [place] normal   Vagina: normal vagina, no discharge, exudate, lesion, or erythema   Urethra: normal   Cervix: Normal, no discharge  PAP done  GCC done  Nontender  Uterus: normal size, contour, position, consistency, mobility, non-tender   Adnexa: normal adnexa and no mass, fullness, tenderness   Lymphatic palpation of lymph nodes in neck, axilla, groin and/or other locations: no lymphadenopathy or masses noted   Skin normal skin turgor and no rashes     Psychiatric orientation to person, place, and time: normal  mood and affect: normal

## 2022-04-20 LAB
C TRACH DNA SPEC QL NAA+PROBE: NEGATIVE
N GONORRHOEA DNA SPEC QL NAA+PROBE: NEGATIVE

## 2022-04-22 ENCOUNTER — TELEPHONE (OUTPATIENT)
Dept: OBGYN CLINIC | Facility: CLINIC | Age: 22
End: 2022-04-22

## 2022-04-25 LAB
LAB AP GYN PRIMARY INTERPRETATION: NORMAL
Lab: NORMAL

## 2022-04-26 ENCOUNTER — TELEPHONE (OUTPATIENT)
Dept: OBGYN CLINIC | Facility: CLINIC | Age: 22
End: 2022-04-26

## 2022-04-26 NOTE — TELEPHONE ENCOUNTER
----- Message from Genia Wang, [address] sent at 4/25/2022  9:16 PM EDT -----  Pap is negative, please let pt know  Thanks!

## 2022-06-07 ENCOUNTER — OFFICE VISIT (OUTPATIENT)
Dept: OBGYN CLINIC | Facility: CLINIC | Age: 22
End: 2022-06-07

## 2022-06-07 VITALS
HEART RATE: 78 BPM | HEIGHT: 62 IN | DIASTOLIC BLOOD PRESSURE: 73 MMHG | SYSTOLIC BLOOD PRESSURE: 112 MMHG | WEIGHT: 135.8 LBS | BODY MASS INDEX: 24.99 KG/M2

## 2022-06-07 DIAGNOSIS — Z30.09 ENCOUNTER FOR COUNSELING REGARDING CONTRACEPTION: Primary | ICD-10-CM

## 2022-06-07 DIAGNOSIS — Z20.2 POSSIBLE EXPOSURE TO STD: ICD-10-CM

## 2022-06-07 LAB — SL AMB POCT URINE HCG: NEGATIVE

## 2022-06-07 PROCEDURE — 3008F BODY MASS INDEX DOCD: CPT | Performed by: NURSE PRACTITIONER

## 2022-06-07 PROCEDURE — 87491 CHLMYD TRACH DNA AMP PROBE: CPT | Performed by: NURSE PRACTITIONER

## 2022-06-07 PROCEDURE — 1036F TOBACCO NON-USER: CPT | Performed by: NURSE PRACTITIONER

## 2022-06-07 PROCEDURE — 99213 OFFICE O/P EST LOW 20 MIN: CPT | Performed by: NURSE PRACTITIONER

## 2022-06-07 PROCEDURE — 81025 URINE PREGNANCY TEST: CPT | Performed by: NURSE PRACTITIONER

## 2022-06-07 PROCEDURE — 87591 N.GONORRHOEAE DNA AMP PROB: CPT | Performed by: NURSE PRACTITIONER

## 2022-06-07 RX ORDER — MEDROXYPROGESTERONE ACETATE 150 MG/ML
150 INJECTION, SUSPENSION INTRAMUSCULAR
Qty: 1 ML | Refills: 3 | Status: SHIPPED | OUTPATIENT
Start: 2022-06-07 | End: 2023-04-07

## 2022-06-07 NOTE — PROGRESS NOTES
Assessment/Plan:    No problem-specific Assessment & Plan notes found for this encounter  Annual due 04/19/2023     Diagnoses and all orders for this visit:    Encounter for counseling regarding contraception  -     medroxyPROGESTERone (DEPO-PROVERA) 150 mg/mL injection; Inject 1 mL (150 mg total) into a muscle every 3 (three) months  Patient to  Depo and return to office with injection  Return to office 06/09/2022 for her injection, reviewed backup method x7 days  Possible exposure to STD  Condom use was recommended  Will call results to patient  Culture for chlamydia and gonorrhea  Declines other STD testing at this time          Subjective:      Patient ID: Vanesa Rocha is a 25 y o  female  HPI patient here for STD check and contraceptive consultation  Patient currently sexually active with new partner, has not been using contraception or condoms  Her LMP was 05/28/2022, has not had sexual relations since her last menses  Reviewed all options and patient would like to restart Depo-Provera  Last used about 1 year ago  She did have weight gain her previous injections  Reviewed diet exercise  Reviewed calcium and vitamin-D for bone health  Aware of irregular bleeding pattern and that 50% of women will have amenorrhea at the end of the 1st year of use  History of positive chlamydia 05/10/2021  Last culture done 04/09/2022 and was negative  Recommended condom use with sexual activity      She has had Gardasil vaccines  Pap done 04/19/2022 was negative    The following portions of the patient's history were reviewed and updated as appropriate: allergies, current medications, past family history, past medical history, past social history, past surgical history and problem list     Review of Systems   Constitutional: Negative for chills and fever  Eyes: Negative for photophobia and visual disturbance  Respiratory: Negative  Cardiovascular: Negative      Genitourinary: Negative for dysuria, menstrual problem and vaginal discharge  Neurological: Negative for dizziness and headaches  Objective:      /73 (BP Location: Left arm, Patient Position: Sitting, Cuff Size: Adult)   Pulse 78   Ht 5' 2" (1 575 m)   Wt 61 6 kg (135 lb 12 8 oz)   LMP 05/28/2022   BMI 24 84 kg/m²          Physical Exam  Constitutional:       Appearance: Normal appearance  Cardiovascular:      Rate and Rhythm: Normal rate  Pulmonary:      Effort: Pulmonary effort is normal    Abdominal:      Palpations: Abdomen is soft  Tenderness: There is no abdominal tenderness  Skin:     General: Skin is warm and dry  Neurological:      Mental Status: She is oriented to person, place, and time     Psychiatric:         Mood and Affect: Mood normal          Behavior: Behavior normal

## 2022-06-09 ENCOUNTER — CLINICAL SUPPORT (OUTPATIENT)
Dept: OBGYN CLINIC | Facility: CLINIC | Age: 22
End: 2022-06-09

## 2022-06-09 VITALS — HEIGHT: 62 IN | BODY MASS INDEX: 24.84 KG/M2

## 2022-06-09 DIAGNOSIS — Z30.42 ENCOUNTER FOR DEPO-PROVERA CONTRACEPTION: Primary | ICD-10-CM

## 2022-06-09 PROCEDURE — 96372 THER/PROPH/DIAG INJ SC/IM: CPT

## 2022-06-09 RX ORDER — MEDROXYPROGESTERONE ACETATE 150 MG/ML
150 INJECTION, SUSPENSION INTRAMUSCULAR
Status: SHIPPED | OUTPATIENT
Start: 2022-06-09

## 2022-06-09 RX ADMIN — MEDROXYPROGESTERONE ACETATE 150 MG: 150 INJECTION, SUSPENSION INTRAMUSCULAR at 15:26

## 2022-06-09 NOTE — PROGRESS NOTES
Depo-Provera      [x]   Patient provided box YES   o 3 Refills remain  o Refills submitted no   Last  Annual Date / Birth control check : 4/19/2022   Last Depo date: RESTART   Side effects: no   HCG: yes  o if applicable: negative   Given by: Davy Yip RN   Site: Left Deltoid     o Calcium supplement daily teaching  o Condoms for 2 weeks following first injection dose

## 2022-06-10 LAB
C TRACH DNA SPEC QL NAA+PROBE: NEGATIVE
N GONORRHOEA DNA SPEC QL NAA+PROBE: NEGATIVE

## 2022-10-11 PROBLEM — Z11.3 SCREENING FOR STDS (SEXUALLY TRANSMITTED DISEASES): Status: RESOLVED | Noted: 2022-04-19 | Resolved: 2022-10-11

## 2022-11-17 ENCOUNTER — OFFICE VISIT (OUTPATIENT)
Dept: OBGYN CLINIC | Facility: CLINIC | Age: 22
End: 2022-11-17

## 2022-11-17 VITALS
RESPIRATION RATE: 16 BRPM | WEIGHT: 132.8 LBS | HEART RATE: 84 BPM | DIASTOLIC BLOOD PRESSURE: 84 MMHG | BODY MASS INDEX: 24.29 KG/M2 | SYSTOLIC BLOOD PRESSURE: 133 MMHG

## 2022-11-17 DIAGNOSIS — Z20.2 POSSIBLE EXPOSURE TO STD: Primary | ICD-10-CM

## 2022-11-17 NOTE — PROGRESS NOTES
Assessment     Diagnoses and all orders for this visit:     Possible Exposure to STD  -     Chlamydia/GC amplified DNA by PCR  Offered bloodwork- pt declines  Will call results to pt       Possible STD exposure        Plan     RTO 2023 for annual    Discussed safe sexual practice in detail       Subjective    Gricelda Centeno is a 25 y o  female who presents for sexually transmitted disease check  Sexual history reviewed with the patient  STI Exposure: denies knowledge of risky exposure  Same partner x 1 year, wants to be checked, declines STD blood work  Previous history of STI chlamydia 2021 Current symptoms none  Contraception: condoms  Does not want to use Depo anymore because of bleeding  Declines other contraception  Menstrual History:  OB History        1    Para        Term                AB   1    Living           SAB        IAB   1    Ectopic        Multiple        Live Births                      Patient's last menstrual period was 2022 (approximate)  The following portions of the patient's history were reviewed and updated as appropriate: allergies, current medications, past family history, past medical history, past social history, past surgical history and problem list     Review of Systems  Pertinent items are noted in HPI          Objective      /84 (BP Location: Right arm, Patient Position: Sitting, Cuff Size: Standard)   Pulse 84   Resp 16   Wt 60 2 kg (132 lb 12 8 oz)   LMP 2022 (Approximate)   BMI 24 29 kg/m²     General:   alert and oriented, in no acute distress   Heart:    Lungs:    Abdomen: soft, non-tender, without masses or organomegaly   Vulva: Deferred, denies concerns   Vagina:    Cervix:    Uterus:    Adnexa:    Lymph Nodes:     Cultures: GC and Chlamydia genprobes

## 2022-11-18 LAB
C TRACH DNA SPEC QL NAA+PROBE: NEGATIVE
N GONORRHOEA DNA SPEC QL NAA+PROBE: NEGATIVE

## 2022-11-22 ENCOUNTER — TELEPHONE (OUTPATIENT)
Dept: OBGYN CLINIC | Facility: CLINIC | Age: 22
End: 2022-11-22

## 2022-11-22 NOTE — TELEPHONE ENCOUNTER
Message left for patient that results are available in \[facility]\"" & HEALTH [facility]  Encouraged to call office with any questions/concerns

## 2022-11-22 NOTE — TELEPHONE ENCOUNTER
----- Message from Carmen Sánchez, [address] sent at 11/20/2022  8:57 PM EST -----   Result is negative, please call patient to inform     Thanks

## 2023-04-25 ENCOUNTER — ANNUAL EXAM (OUTPATIENT)
Dept: OBGYN CLINIC | Facility: CLINIC | Age: 23
End: 2023-04-25

## 2023-04-25 VITALS
SYSTOLIC BLOOD PRESSURE: 112 MMHG | HEART RATE: 80 BPM | BODY MASS INDEX: 25.49 KG/M2 | HEIGHT: 61 IN | WEIGHT: 135 LBS | RESPIRATION RATE: 18 BRPM | DIASTOLIC BLOOD PRESSURE: 73 MMHG

## 2023-04-25 DIAGNOSIS — Z20.2 POSSIBLE EXPOSURE TO STD: ICD-10-CM

## 2023-04-25 DIAGNOSIS — Z01.419 ENCOUNTER FOR GYNECOLOGICAL EXAMINATION WITHOUT ABNORMAL FINDING: Primary | ICD-10-CM

## 2023-04-25 NOTE — PROGRESS NOTES
ASSESSMENT & PLAN: Dawn Hutchinson is a 26 y o  [place] with normal gynecologic exam     1   Routine well woman exam done today  2  Pap:  The patient's last pap was 2022  It was normal     Pap was not done today  Current ASCCP Guidelines reviewed  3   STD testing  was done for [facility] and [place] only  4  Gardasil recommendations reviewed  is vaccinated  5  The following were reviewed in today's visit: breast self exam, STD testing, family planning choices, adequate intake of calcium and vitamin D, exercise and healthy diet      Depression Screening Follow-up Plan: Patient's depression screening was negative  with a PHQ-2 score of 0   Their PHQ-9 score was  0  Clinically patient does not have depression  No treatment is required  BMI Counseling: Body mass index is 25 51 kg/m²  The BMI is above normal  Nutrition recommendations include 3-5 servings of fruits/vegetables daily, moderation in carbohydrate intake and reducing intake of cholesterol  Exercise recommendations include exercising 3-5 times per week  CC:  Annual Gynecologic Examination    HPI: Dawn Hutchinson is a 26 y o  [place] who presents for annual gynecologic examination  Previously was on Depo for contraception but stopped use due to bleeding  History of chlamydia on 2021 last culture for chlamydia and gonorrhea done 2022 and was negative      Health Maintenance:    She wears her seatbelt routinely  She does perform irregular monthly self breast exams  She feels safe at home  Past Medical History:   Diagnosis Date   • Heart murmur        Past Surgical History:   Procedure Laterality Date   • EYE SURGERY      stye removal       OB/Gyn History:    Pt does not have menstrual issues  Monthly, x 3 days    History of sexually transmitted infection: Yes  History of abnormal pap smears: No      Patient is currently sexually active      The current method of family planning is condoms     OB History        1    Para        Term         AB   1    Living           SAB        IAB   1    Ectopic        Multiple        Live Births                     Family History   Problem Relation Age of Onset   • No Known Problems Mother    • No Known Problems Father    • No Known Problems Sister    • No Known Problems Brother    • No Known Problems Maternal Grandmother    • No Known Problems Maternal Grandfather    • No Known Problems Paternal Grandmother    • No Known Problems Paternal Grandfather    • Diabetes Sister    • No Known Problems Brother    • Asthma Brother    • Breast cancer Neg Hx    • Colon cancer Neg Hx    • Ovarian cancer Neg Hx        Social History:  Social History     Socioeconomic History   • Marital status: Single     Spouse name: Not on file   • Number of children: Not on file   • Years of education: Not on file   • Highest education level: Not on file   Occupational History   • Not on file   Tobacco Use   • Smoking status: Never   • Smokeless tobacco: Never   Vaping Use   • Vaping Use: Never used   Substance and Sexual Activity   • Alcohol use: Yes     Comment: 2 x a month   • Drug use: No   • Sexual activity: Yes     Birth control/protection: None     Comment: pt cell phone number is [phone]   Other Topics Concern   • Not on file   Social History Narrative   • Not on file     Social Determinants of Health     Financial Resource Strain: Low Risk    • Difficulty of Paying Living Expenses: Not hard at all   Food Insecurity: No Food Insecurity   • Worried About Running Out of Food in the Last Year: Never true   • Ran Out of Food in the Last Year: Never true   Transportation Needs: No Transportation Needs   • Lack of Transportation (Medical): No   • Lack of Transportation (Non-Medical):  No   Physical Activity: Not on file   Stress: Not on file   Social Connections: Not on file   Intimate Partner Violence: Not on file   Housing Stability: Low Risk    • Unable to Pay for Housing in the Last Year: No   • Number of Places Lived in the Last Year: 1   • Unstable Housing in the Last Year: No     Patient is single  Patient is currently employed   Graduates from [employer]  Allergies   Allergen Reactions   • Shrimp (Diagnostic) - Food Allergy Lip Swelling         Current Outpatient Medications:   •  methocarbamol (ROBAXIN) 500 mg tablet, Take 1 tablet (500 mg total) by mouth 2 (two) times a day (Patient not taking: Reported on 1/18/2022), Disp: 20 tablet, Rfl: 0  •  naproxen (Naprosyn) 500 mg tablet, Take 1 tablet (500 mg total) by mouth 2 (two) times a day with meals (Patient not taking: Reported on 1/18/2022), Disp: 30 tablet, Rfl: 0    Current Facility-Administered Medications:   •  EPINEPHrine (EPIPEN) injection 0 3 mg, 0 3 mg, Intramuscular, Once, Jeff Brooks PA-C  •  medroxyPROGESTERone (DEPO-PROVERA) IM injection 150 mg, 150 mg, Intramuscular, Q3 Months, Truong Goodwin MD, 150 mg at 06/09/22 1526    Review of Systems:  Constitutional :no fever, feels well, no tiredness, no recent weight gain or loss  ENT: no ear ache, no loss of hearing, no nosebleeds or nasal discharge, no sore throat or hoarseness  Cardiovascular: no complaints of slow or fast heart beat, no chest pain, no palpitations, no leg claudication or lower extremity edema  Respiratory: no complaints of shortness of shortness of breath, no TERAN  Breasts:no complaints of breast pain, breast lump, or nipple discharge  Gastrointestinal: no complaints of abdominal pain, constipation, nausea, vomiting, or diarrhea or bloody stools  Genitourinary : no complaints of dysuria, incontinence, pelvic pain, no dysmenorrhea, vaginal discharge or abnormal vaginal bleeding and as noted in HPI  Musculoskeletal: no complaints of arthralgia, no myalgia, no joint swelling or stiffness, no limb pain or swelling    Integumentary: no complaints of skin rash or lesion, itching or dry skin  Neurological: no complaints of headache, no confusion, no numbness or tingling, no "dizziness or fainting    Objective      /73 (BP Location: Right arm, Patient Position: Sitting, Cuff Size: Adult)   Pulse 80   Resp 18   Ht 5' 1\" (1 549 m)   Wt 61 2 kg (135 lb)   LMP 04/16/2023 (Exact Date)   BMI 25 51 kg/m²     General:   appears stated age, cooperative, alert normal mood and affect   Neck: normal, supple,trachea midline, no masses   Heart: regular rate and rhythm, S1, S2 normal, no murmur, click, rub or gallop   Lungs: clear to auscultation bilaterally   Breasts: normal appearance, no masses or tenderness, Inspection negative, No nipple retraction or dimpling, No nipple discharge or bleeding, No axillary or supraclavicular adenopathy, Normal to palpation without dominant masses, Taught monthly breast self examination   Abdomen: soft, non-tender, without masses or organomegaly   Vulva: Bartholin's, Urethra, [place] normal   Vagina: normal vagina, no discharge, exudate, lesion, or erythema   Urethra: normal   Cervix: Normal, no discharge  GCC done  Nontender  Uterus: normal size, contour, position, consistency, mobility, non-tender   Adnexa: normal adnexa and no mass, fullness, tenderness   Lymphatic palpation of lymph nodes in neck, axilla, groin and/or other locations: no lymphadenopathy or masses noted   Skin normal skin turgor and no rashes     Psychiatric orientation to person, place, and time: normal  mood and affect: normal         "

## 2023-04-27 LAB
C TRACH DNA SPEC QL NAA+PROBE: NEGATIVE
N GONORRHOEA DNA SPEC QL NAA+PROBE: NEGATIVE

## 2023-04-28 ENCOUNTER — TELEPHONE (OUTPATIENT)
Dept: OBGYN CLINIC | Facility: CLINIC | Age: 23
End: 2023-04-28

## 2023-04-28 NOTE — TELEPHONE ENCOUNTER
----- Message from Pat Robertson, [address] sent at 4/28/2023  8:46 AM EDT -----   Please inform pt that her culture for chlamydia and gonorrhea were negative  Thank You!

## 2023-04-28 NOTE — TELEPHONE ENCOUNTER
Attempted to call, and left a message with test results, ok per communication consent form  Office number provided in message

## 2023-07-09 NOTE — PROGRESS NOTES
Assessment      Possible STD exposure    Diagnoses and all orders for this visit:    Possible exposure to STD  Culture for [facility] and [facility] done today, declines other testing. We will call results when available  Reviewed safe sexual doses when sexually active  Plan     Discussed safe sexual practice in detail   Annual due 2023    Akil Caicedo is a 26 y.o. female who presents for sexually transmitted disease check. Sexual history reviewed with the patient. STI Exposure: denies knowledge of risky exposure. Recently broke up with her boyfriend and wants to be proactive and have testing done. Previous history of STI-  chlamydia. Last culture for [facility] and CT was 23 and was negative. Current symptoms none. Contraception: abstinence, currently not sexually active. Menstrual History:  OB History        1    Para        Term                AB   1    Living           SAB        IAB   1    Ectopic        Multiple        Live Births                      Patient's last menstrual period was 2023. The following portions of the patient's history were reviewed and updated as appropriate: allergies, current medications, past family history, past medical history, past social history, past surgical history and problem list.    Review of Systems  Pertinent items are noted in HPI.         Objective      /80   Pulse 70   Ht 5' 1" (1.549 m)   Wt 60.5 kg (133 lb 6.4 oz)   LMP 2023   BMI 25.21 kg/m²     General:   alert and oriented, in no acute distress   Heart: regular rate and rhythm, S1, S2 normal, no murmur, click, rub or gallop   Lungs: clear to auscultation bilaterally   Abdomen: soft, non-tender, without masses or organomegaly   Vulva: Denies concerns   Vagina:    Cervix:    Uterus:    Adnexa:    Lymph Nodes:     Cultures: GC and Chlamydia genprobes

## 2023-07-10 ENCOUNTER — OFFICE VISIT (OUTPATIENT)
Dept: OBGYN CLINIC | Facility: CLINIC | Age: 23
End: 2023-07-10

## 2023-07-10 VITALS
HEART RATE: 70 BPM | BODY MASS INDEX: 25.19 KG/M2 | HEIGHT: 61 IN | WEIGHT: 133.4 LBS | DIASTOLIC BLOOD PRESSURE: 80 MMHG | SYSTOLIC BLOOD PRESSURE: 118 MMHG

## 2023-07-10 DIAGNOSIS — Z20.2 POSSIBLE EXPOSURE TO STD: Primary | ICD-10-CM

## 2023-07-10 PROCEDURE — 87591 N.GONORRHOEAE DNA AMP PROB: CPT | Performed by: NURSE PRACTITIONER

## 2023-07-10 PROCEDURE — 87491 CHLMYD TRACH DNA AMP PROBE: CPT | Performed by: NURSE PRACTITIONER

## 2023-07-10 PROCEDURE — 99213 OFFICE O/P EST LOW 20 MIN: CPT | Performed by: NURSE PRACTITIONER

## 2023-07-11 LAB
C TRACH DNA SPEC QL NAA+PROBE: NEGATIVE
N GONORRHOEA DNA SPEC QL NAA+PROBE: NEGATIVE

## 2023-07-12 ENCOUNTER — TELEPHONE (OUTPATIENT)
Dept: OBGYN CLINIC | Facility: CLINIC | Age: 23
End: 2023-07-12

## 2023-07-12 NOTE — TELEPHONE ENCOUNTER
----- Message from Primo Eaton, [address] [place] [address] sent at 7/11/2023  5:42 PM EDT -----  Please inform pt that her culture for chlamydia and gonorrhea were negative. Thank You!

## 2023-07-12 NOTE — TELEPHONE ENCOUNTER
Attempted to call, left a message with test results, ok per communication consent form. Office number provided in case of any questions or concerns.

## 2024-02-21 PROBLEM — Z11.3 ROUTINE SCREENING FOR STI (SEXUALLY TRANSMITTED INFECTION): Status: RESOLVED | Noted: 2019-01-21 | Resolved: 2024-02-21

## 2024-02-21 PROBLEM — Z01.419 ENCOUNTER FOR GYNECOLOGICAL EXAMINATION WITHOUT ABNORMAL FINDING: Status: RESOLVED | Noted: 2022-04-19 | Resolved: 2024-02-21

## 2024-05-06 ENCOUNTER — OFFICE VISIT (OUTPATIENT)
Dept: FAMILY MEDICINE CLINIC | Facility: CLINIC | Age: 24
End: 2024-05-06
Payer: COMMERCIAL

## 2024-05-06 VITALS
HEIGHT: 62 IN | DIASTOLIC BLOOD PRESSURE: 80 MMHG | OXYGEN SATURATION: 98 % | WEIGHT: 140.4 LBS | BODY MASS INDEX: 25.83 KG/M2 | RESPIRATION RATE: 18 BRPM | TEMPERATURE: 98 F | HEART RATE: 101 BPM | SYSTOLIC BLOOD PRESSURE: 134 MMHG

## 2024-05-06 DIAGNOSIS — Z11.4 SCREENING FOR HIV (HUMAN IMMUNODEFICIENCY VIRUS): ICD-10-CM

## 2024-05-06 DIAGNOSIS — E16.2 HYPOGLYCEMIA: ICD-10-CM

## 2024-05-06 DIAGNOSIS — F41.9 ANXIETY AND DEPRESSION: Primary | ICD-10-CM

## 2024-05-06 DIAGNOSIS — F32.A ANXIETY AND DEPRESSION: Primary | ICD-10-CM

## 2024-05-06 DIAGNOSIS — Z00.00 ANNUAL PHYSICAL EXAM: ICD-10-CM

## 2024-05-06 DIAGNOSIS — Z11.59 NEED FOR HEPATITIS C SCREENING TEST: ICD-10-CM

## 2024-05-06 PROBLEM — Z20.2 POSSIBLE EXPOSURE TO STD: Status: RESOLVED | Noted: 2022-01-18 | Resolved: 2024-05-06

## 2024-05-06 PROBLEM — Z20.2 EXPOSURE TO STD: Status: RESOLVED | Noted: 2022-11-17 | Resolved: 2024-05-06

## 2024-05-06 LAB
HBA1C MFR BLD HPLC: 5.1 %
HCV AB SER-ACNC: NONREACTIVE

## 2024-05-06 PROCEDURE — 99214 OFFICE O/P EST MOD 30 MIN: CPT | Performed by: INTERNAL MEDICINE

## 2024-05-06 RX ORDER — SERTRALINE HYDROCHLORIDE 25 MG/1
TABLET, FILM COATED ORAL DAILY
Qty: 42 TABLET | Refills: 0 | Status: SHIPPED | OUTPATIENT
Start: 2024-05-06 | End: 2024-06-03

## 2024-05-06 NOTE — PROGRESS NOTES
Assessment/Plan:       Problem List Items Addressed This Visit       Anxiety - Primary     Symptoms consistent with TIKA/Depression  Will try Zoloft since this has worked for mom  Discussed treatment options including medication and therapy  Discussed side effects of SSRI's at length including black box warning, GI upset, insomnia, sexual dysfunction  Recheck in 4-6 weeks or sooner if issues  Help line information provided           Relevant Medications    sertraline (ZOLOFT) 25 mg tablet    Hypoglycemia     Slightly low reading once but likely symptoms she is having are related to anxiety  We can check basic labs         Relevant Orders    CBC and differential    Comprehensive metabolic panel    TSH, 3rd generation with Free T4 reflex    Hemoglobin A1C     Other Visit Diagnoses       Need for hepatitis C screening test        Relevant Orders    Hepatitis C Antibody    Screening for HIV (human immunodeficiency virus)        Relevant Orders    HIV 1/2 AG/AB w Reflex [facility] for 2 yr old and above    Annual physical exam        Relevant Orders    Lipid Panel with Direct LDL reflex              Subjective:     Chief Complaint   Patient presents with    Headache     Patient stated she been experiencing lightheaded and antxey . Low blood sugar level 60           Patient ID: Shelly Venegas is a 24 y.o. female who presents with concern for lightheadedness, low blood sugar and significant anxiety. Reports a history of anxiety for many years but has never been on medication. She is feeling overwhelmed and the anxiety is affecting her day to day life. She did not think she felt depressed but feels tearful and wants to stay isolated. States she has always been someone who likes to be more isolated but she feels more withdrawn lately. No suicidal ideations. She is sleeping more than usual. She also notes that she is eating more because she is worried that her blood sugar is dropping. When she eats she sometimes feels better but  sometimes feels the same. Her mom has a hx of depression treated with sertraline. She has a good support system. She has never spoken to a therapist before.         Patient's past medical history, surgical history, family history, medications, allergies and social history reviewed and updated    Review of Systems   Constitutional:  Negative for chills and fever.   HENT:  Negative for congestion and sore throat.    Respiratory:  Negative for cough and shortness of breath.    Cardiovascular:  Negative for chest pain and leg swelling.   Gastrointestinal:  Negative for abdominal pain, blood in stool and diarrhea.   Genitourinary:  Negative for dysuria and frequency.   Neurological:  Positive for light-headedness. Negative for headaches.   Psychiatric/Behavioral:  Positive for decreased concentration and dysphoric mood. The patient is nervous/anxious.          All other ROS negative.     Objective:    Vitals:    05/06/24 1005   BP: 134/80   Pulse: 101   Resp: 18   Temp: 98 °F (36.7 °C)   SpO2: 98%          Physical Exam  Vitals reviewed.   Constitutional:       General: She is not in acute distress.  HENT:      Right Ear: External ear normal.      Left Ear: External ear normal.      Nose: Nose normal.      Mouth/Throat:      Mouth: Mucous membranes are moist.   Eyes:      Conjunctiva/sclera: Conjunctivae normal.   Cardiovascular:      Rate and Rhythm: Normal rate and regular rhythm.      Heart sounds: No murmur heard.  Pulmonary:      Effort: Pulmonary effort is normal. No respiratory distress.      Breath sounds: No wheezing.   Abdominal:      General: There is no distension.      Palpations: Abdomen is soft.      Tenderness: There is no abdominal tenderness.   Musculoskeletal:      Right lower leg: No edema.      Left lower leg: No edema.   Lymphadenopathy:      Cervical: No cervical adenopathy.   Neurological:      Mental Status: She is alert and oriented to person, place, and time.   Psychiatric:         Attention  "and Perception: Attention normal.         Mood and Affect: Mood is anxious and depressed. Affect is tearful.         Speech: Speech normal.         Behavior: Behavior is cooperative.         Thought Content: Thought content does not include homicidal or suicidal ideation.               Portions of the record may have been created with voice recognition software.  Occasional wrong word or \"sound a like\" substitutions may have occurred due to the inherent limitations of voice recognition software.  Read the chart carefully and recognize, using context, where substitutions have occurred.     Armida Syed MD  Internal Medicine and Pediatrics  "

## 2024-05-06 NOTE — ASSESSMENT & PLAN NOTE
Slightly low reading once but likely symptoms she is having are related to anxiety  We can check basic labs

## 2024-05-06 NOTE — ASSESSMENT & PLAN NOTE
Symptoms consistent with TIKA/Depression  Will try Zoloft since this has worked for mom  Discussed treatment options including medication and therapy  Discussed side effects of SSRI's at length including black box warning, GI upset, insomnia, sexual dysfunction  Recheck in 4-6 weeks or sooner if issues  Help line information provided

## 2024-05-08 ENCOUNTER — APPOINTMENT (OUTPATIENT)
Dept: LAB | Facility: CLINIC | Age: 24
End: 2024-05-08
Payer: COMMERCIAL

## 2024-05-08 ENCOUNTER — ANNUAL EXAM (OUTPATIENT)
Dept: OBGYN CLINIC | Facility: CLINIC | Age: 24
End: 2024-05-08

## 2024-05-08 VITALS
RESPIRATION RATE: 18 BRPM | DIASTOLIC BLOOD PRESSURE: 81 MMHG | HEIGHT: 62 IN | SYSTOLIC BLOOD PRESSURE: 129 MMHG | WEIGHT: 139 LBS | HEART RATE: 91 BPM | BODY MASS INDEX: 25.58 KG/M2

## 2024-05-08 DIAGNOSIS — B37.31 VAGINAL CANDIDA: ICD-10-CM

## 2024-05-08 DIAGNOSIS — Z01.419 WOMEN'S ANNUAL ROUTINE GYNECOLOGICAL EXAMINATION: Primary | ICD-10-CM

## 2024-05-08 DIAGNOSIS — Z20.2 STD EXPOSURE: ICD-10-CM

## 2024-05-08 PROCEDURE — 99395 PREV VISIT EST AGE 18-39: CPT | Performed by: OBSTETRICS & GYNECOLOGY

## 2024-05-08 PROCEDURE — 86780 TREPONEMA PALLIDUM: CPT

## 2024-05-08 PROCEDURE — 36415 COLL VENOUS BLD VENIPUNCTURE: CPT

## 2024-05-08 PROCEDURE — 87491 CHLMYD TRACH DNA AMP PROBE: CPT

## 2024-05-08 PROCEDURE — 87389 HIV-1 AG W/HIV-1&-2 AB AG IA: CPT

## 2024-05-08 PROCEDURE — 87591 N.GONORRHOEAE DNA AMP PROB: CPT

## 2024-05-08 RX ORDER — FLUCONAZOLE 150 MG/1
150 TABLET ORAL ONCE
Qty: 1 TABLET | Refills: 0 | Status: SHIPPED | OUTPATIENT
Start: 2024-05-08 | End: 2024-05-08

## 2024-05-08 NOTE — PROGRESS NOTES
ASSESSMENT & PLAN: Shelly Venegas is a 24 y.o.  with normal gynecologic exam.    1.  Routine well woman exam done today  2.  Pap:  The patient's last pap was last year .    It was normal.    Pap was not done today.    Current ASCCP Guidelines reviewed.   3.  STD testing  was done: HIV, RPR, G/C  4. Vaginal candida: diflucan 150 mg (1 tablet)  4.  Gardasil recommendations reviewed  is vaccinated.  5. The following were reviewed in today's visit: breast self exam, STD testing, HIV risk factors and prevention, use and side effects of OCPs, and family planning choices  6.    CC:  Annual Gynecologic Examination    HPI: Shelly Venegas is a 24 y.o.  who presents for annual gynecologic examination.    She has the following concerns:  none    Health Maintenance:    She wears her seatbelt routinely.    She does not perform regular monthly self breast exams.    She feels safe at home.     Past Medical History:   Diagnosis Date    Anxiety     Depression     Heart murmur        Past Surgical History:   Procedure Laterality Date    EYE SURGERY      stye removal       OB/Gyn History:    Pt does not have menstrual issues.     History of sexually transmitted infection: Yes. Patient had history of chlamydia  and   History of abnormal pap smears: No .    Patient is currently sexually active.    The current method of family planning is none.    OB History          1    Para        Term                AB   1    Living             SAB        IAB   1    Ectopic        Multiple        Live Births                     Family History   Problem Relation Age of Onset    Depression Mother     No Known Problems Father     No Known Problems Sister     Diabetes Sister     No Known Problems Brother     No Known Problems Brother     Asthma Brother     No Known Problems Maternal Grandmother     No Known Problems Maternal Grandfather     No Known Problems Paternal Grandmother     No Known Problems Paternal Grandfather      Breast cancer Neg Hx     Colon cancer Neg Hx     Ovarian cancer Neg Hx        Social History:  Social History     Socioeconomic History    Marital status: Single     Spouse name: Not on file    Number of children: Not on file    Years of education: Not on file    Highest education level: Not on file   Occupational History    Not on file   Tobacco Use    Smoking status: Never    Smokeless tobacco: Never   Vaping Use    Vaping status: Never Used   Substance and Sexual Activity    Alcohol use: Yes     Comment: 2 x a month    Drug use: No    Sexual activity: Not Currently     Birth control/protection: None     Comment: pt cell phone number is [phone]   Other Topics Concern    Not on file   Social History Narrative    Not on file     Social Determinants of Health     Financial Resource Strain: Low Risk  (5/8/2024)    Overall Financial Resource Strain (CARDIA)     Difficulty of Paying Living Expenses: Not hard at all   Food Insecurity: No Food Insecurity (5/8/2024)    Hunger Vital Sign     Worried About Running Out of Food in the Last Year: Never true     Ran Out of Food in the Last Year: Never true   Transportation Needs: No Transportation Needs (5/8/2024)    PRAPARE - Transportation     Lack of Transportation (Medical): No     Lack of Transportation (Non-Medical): No   Physical Activity: Not on file   Stress: No Stress Concern Present (4/19/2022)    [employer] [place] [employer] - Occupational Stress Questionnaire     Feeling of Stress : Not at all   Social Connections: Not on file   Intimate Partner Violence: Not At Risk (4/19/2022)    Humiliation, Afraid, Rape, and Kick questionnaire     Fear of Current or Ex-Partner: No     Emotionally Abused: No     Physically Abused: No     Sexually Abused: No   Housing Stability: Low Risk  (5/8/2024)    Housing Stability Vital Sign     Unable to Pay for Housing in the Last Year: No     Number of Places Lived in the Last Year: 1     Unstable Housing in the Last  "Year: No     Patient is single.  Patient is currently employed patient is an LPN    Allergies   Allergen Reactions    Shrimp (Diagnostic) - Food Allergy Lip Swelling         Current Outpatient Medications:     fluconazole (DIFLUCAN) 150 mg tablet, Take 1 tablet (150 mg total) by mouth once for 1 dose, Disp: 1 tablet, Rfl: 0    sertraline (ZOLOFT) 25 mg tablet, Take 1 tablet (25 mg total) by mouth daily for 14 days, THEN 2 tablets (50 mg total) daily for 14 days., Disp: 42 tablet, Rfl: 0    Current Facility-Administered Medications:     EPINEPHrine (EPIPEN) injection 0.3 mg, 0.3 mg, Intramuscular, Once, Ani Monroe PA-C    Review of Systems:  Constitutional :no fever, feels well, no tiredness, no recent weight gain or loss  ENT: no ear ache, no loss of hearing, no nosebleeds or nasal discharge, no sore throat or hoarseness.  Cardiovascular: no complaints of slow or fast heart beat, no chest pain, no palpitations, no leg claudication or lower extremity edema.  Respiratory: no complaints of shortness of shortness of breath, no TERAN  Breasts:no complaints of breast pain, breast lump, or nipple discharge  Gastrointestinal: no complaints of abdominal pain, constipation, nausea, vomiting, or diarrhea or bloody stools  Genitourinary : no complaints of dysuria, incontinence, pelvic pain, no dysmenorrhea, vaginal discharge or abnormal vaginal bleeding and as noted in HPI.  Musculoskeletal: no complaints of arthralgia, no myalgia, no joint swelling or stiffness, no limb pain or swelling.  Integumentary: no complaints of skin rash or lesion, itching or dry skin  Neurological: no complaints of headache, no confusion, no numbness or tingling, no dizziness or fainting    Objective      /81 (BP Location: Right arm, Patient Position: Sitting, Cuff Size: Adult)   Pulse 91   Resp 18   Ht 5' 2\" (1.575 m)   Wt 63 kg (139 lb)   LMP 04/15/2024 (Approximate)   BMI 25.42 kg/m²     General:   appears stated age, cooperative, " alert normal mood and affect   Neck: normal, supple,trachea midline, no masses   Heart: regular rate and rhythm, S1, S2 normal, no murmur, click, rub or gallop   Lungs: clear to auscultation bilaterally   Breasts: normal appearance, no masses or tenderness   Abdomen: soft, non-tender, without masses or organomegaly   Vulva: normal   Vagina: normal vagina   Urethra: normal   Cervix: Normal, no discharge.   Uterus: not examined   Adnexa: not evaluated   Lymphatic palpation of lymph nodes in neck, axilla, groin and/or other locations: no lymphadenopathy or masses noted   Skin normal skin turgor and no rashes.   Psychiatric orientation to person, place, and time: normal. mood and affect: normal

## 2024-05-09 LAB
C TRACH DNA SPEC QL NAA+PROBE: NEGATIVE
HIV 1+2 AB+HIV1 P24 AG SERPL QL IA: NORMAL
HIV 2 AB SERPL QL IA: NORMAL
HIV1 AB SERPL QL IA: NORMAL
HIV1 P24 AG SERPL QL IA: NORMAL
N GONORRHOEA DNA SPEC QL NAA+PROBE: NEGATIVE
TREPONEMA PALLIDUM IGG+IGM AB [PRESENCE] IN SERUM OR PLASMA BY IMMUNOASSAY: NORMAL

## 2024-05-23 DIAGNOSIS — F41.9 ANXIETY AND DEPRESSION: ICD-10-CM

## 2024-05-23 DIAGNOSIS — F32.A ANXIETY AND DEPRESSION: ICD-10-CM

## 2024-05-24 ENCOUNTER — OFFICE VISIT (OUTPATIENT)
Dept: OBGYN CLINIC | Facility: CLINIC | Age: 24
End: 2024-05-24

## 2024-05-24 VITALS
RESPIRATION RATE: 18 BRPM | WEIGHT: 138 LBS | SYSTOLIC BLOOD PRESSURE: 129 MMHG | DIASTOLIC BLOOD PRESSURE: 85 MMHG | BODY MASS INDEX: 25.4 KG/M2 | HEART RATE: 77 BPM | HEIGHT: 62 IN

## 2024-05-24 DIAGNOSIS — B37.31 VAGINAL CANDIDA: Primary | ICD-10-CM

## 2024-05-24 PROCEDURE — 99213 OFFICE O/P EST LOW 20 MIN: CPT | Performed by: OBSTETRICS & GYNECOLOGY

## 2024-05-24 RX ORDER — SERTRALINE HYDROCHLORIDE 25 MG/1
TABLET, FILM COATED ORAL
Qty: 42 TABLET | Refills: 1 | Status: SHIPPED | OUTPATIENT
Start: 2024-05-24 | End: 2024-06-21

## 2024-05-24 RX ORDER — FLUCONAZOLE 150 MG/1
150 TABLET ORAL ONCE
Qty: 1 TABLET | Refills: 0 | Status: SHIPPED | OUTPATIENT
Start: 2024-05-24 | End: 2024-05-24

## 2024-05-24 NOTE — ASSESSMENT & PLAN NOTE
Thin white discharge pt reports more than her baseline in absence of other symptoms. S.p. 1 dose of diflucan. Symptoms suspicious for physiologic discharge which was discussed with pt. Will send for 1 more dose of diflucan at this time with return to office PRN if symptoms worsen.

## 2024-05-24 NOTE — PROGRESS NOTES
Ambulatory Visit  Name: Shelly Venegas      : 2000      MRN: [id number]  Encounter Provider: Resident MONALISA Franks  Encounter Date: 2024   [facility]    Assessment & Plan   1. Vaginal candida  Assessment & Plan:  Thin white discharge pt reports more than her baseline in absence of other symptoms. S.p. 1 dose of diflucan. Symptoms suspicious for physiologic discharge which was discussed with pt. Will send for 1 more dose of diflucan at this time with return to office PRN if symptoms worsen.  Orders:  -     fluconazole (DIFLUCAN) 150 mg tablet; Take 1 tablet (150 mg total) by mouth once for 1 dose      History of Present Illness     Shelly Venegas is a 24 y.o. female who presents for a follow up visit following a vaginal yeast infection at the start of May. Pt reports on  she had yeast infection with an office sample showing yeast. She had symptoms of thick white discharge before that time and treated herself with monostat which improved symptoms. At the visit in  May she was prescribed 1 dose of diflucan which she took. Pt denies pruritus, irritation, rashes, fever. She continues to have white discharge though notes it is thin sometimes watery and non-odorous without discoloration. She has never had a yeast infection before this. She denies other symptoms or new sexual encounters.    Review of Systems   Constitutional:  Negative for chills and fever.   HENT:  Negative for ear pain and sore throat.    Eyes:  Negative for pain and visual disturbance.   Respiratory:  Negative for cough and shortness of breath.    Cardiovascular:  Negative for chest pain and palpitations.   Gastrointestinal:  Negative for abdominal pain and vomiting.   Genitourinary:  Positive for vaginal discharge. Negative for dysuria, hematuria, pelvic pain, vaginal bleeding and vaginal pain.   Musculoskeletal:  Negative for arthralgias and back pain.   Skin:  Negative for color  change and rash.   Neurological:  Negative for seizures and syncope.       Past Medical History   Past Medical History:   Diagnosis Date    Anxiety     Depression     Heart murmur      Past Surgical History:   Procedure Laterality Date    EYE SURGERY      stye removal     Family History   Problem Relation Age of Onset    Depression Mother     No Known Problems Father     No Known Problems Sister     Diabetes Sister     No Known Problems Brother     No Known Problems Brother     Asthma Brother     No Known Problems Maternal Grandmother     No Known Problems Maternal Grandfather     No Known Problems Paternal Grandmother     No Known Problems Paternal Grandfather     Breast cancer Neg Hx     Colon cancer Neg Hx     Ovarian cancer Neg Hx      Current Outpatient Medications on File Prior to Visit   Medication Sig Dispense Refill    sertraline (ZOLOFT) 25 mg tablet Take 1 tablet (25 mg total) by mouth daily for 14 days, THEN 2 tablets (50 mg total) daily for 14 days. 42 tablet 1     Current Facility-Administered Medications on File Prior to Visit   Medication Dose Route Frequency Provider Last Rate Last Admin    EPINEPHrine (EPIPEN) injection 0.3 mg  0.3 mg Intramuscular Once Ani Monroe PA-C         Allergies   Allergen Reactions    Shrimp (Diagnostic) - Food Allergy Lip Swelling      Current Outpatient Medications on File Prior to Visit   Medication Sig Dispense Refill    sertraline (ZOLOFT) 25 mg tablet Take 1 tablet (25 mg total) by mouth daily for 14 days, THEN 2 tablets (50 mg total) daily for 14 days. 42 tablet 1     Current Facility-Administered Medications on File Prior to Visit   Medication Dose Route Frequency Provider Last Rate Last Admin    EPINEPHrine (EPIPEN) injection 0.3 mg  0.3 mg Intramuscular Once Ani Monroe PA-C          Social History     Tobacco Use    Smoking status: Never    Smokeless tobacco: Never   Vaping Use    Vaping status: Never Used   Substance and Sexual Activity    Alcohol use: Yes  "    Comment: 2 x a month    Drug use: No    Sexual activity: Not Currently     Birth control/protection: None     Comment: pt cell phone number is [phone]     Objective     /85 (BP Location: Left arm, Patient Position: Sitting, Cuff Size: Adult)   Pulse 77   Resp 18   Ht 5' 2\" (1.575 m)   Wt 62.6 kg (138 lb)   LMP 05/22/2024 (Exact Date)   BMI 25.24 kg/m²     Physical Exam  Constitutional:       General: She is not in acute distress.     Appearance: Normal appearance.   HENT:      Head: Normocephalic and atraumatic.      Nose: Nose normal.      Mouth/Throat:      Pharynx: Oropharynx is clear.   Eyes:      Conjunctiva/sclera: Conjunctivae normal.   Cardiovascular:      Rate and Rhythm: Normal rate.   Pulmonary:      Effort: Pulmonary effort is normal. No respiratory distress.   Abdominal:      General: There is no distension.      Tenderness: There is no guarding.   Neurological:      Mental Status: She is alert.       Administrative Statements   I have spent a total time of 20 minutes on 05/24/24 In caring for this patient including Counseling / Coordination of care, Documenting in the medical record, Reviewing / ordering tests, medicine, procedures  , and Obtaining or reviewing history  .        "

## 2024-06-03 ENCOUNTER — OFFICE VISIT (OUTPATIENT)
Dept: FAMILY MEDICINE CLINIC | Facility: CLINIC | Age: 24
End: 2024-06-03
Payer: COMMERCIAL

## 2024-06-03 VITALS
OXYGEN SATURATION: 99 % | RESPIRATION RATE: 18 BRPM | SYSTOLIC BLOOD PRESSURE: 118 MMHG | HEIGHT: 62 IN | WEIGHT: 140.8 LBS | BODY MASS INDEX: 25.91 KG/M2 | DIASTOLIC BLOOD PRESSURE: 70 MMHG | HEART RATE: 86 BPM | TEMPERATURE: 98 F

## 2024-06-03 DIAGNOSIS — F41.9 ANXIETY AND DEPRESSION: Primary | ICD-10-CM

## 2024-06-03 DIAGNOSIS — F32.A ANXIETY AND DEPRESSION: Primary | ICD-10-CM

## 2024-06-03 DIAGNOSIS — Z00.00 ANNUAL PHYSICAL EXAM: ICD-10-CM

## 2024-06-03 PROCEDURE — 99213 OFFICE O/P EST LOW 20 MIN: CPT | Performed by: INTERNAL MEDICINE

## 2024-06-03 RX ORDER — SERTRALINE HYDROCHLORIDE 100 MG/1
100 TABLET, FILM COATED ORAL DAILY
Qty: 30 TABLET | Refills: 3 | Status: SHIPPED | OUTPATIENT
Start: 2024-06-03

## 2024-06-03 NOTE — ASSESSMENT & PLAN NOTE
Doing much better but still anxious so we an increase dose to 100 mg daily  She will message me with update in 4-6 weeks  If needed consider adding Buspar

## 2024-06-03 NOTE — PROGRESS NOTES
Adult Annual Physical  Name: Shelly Venegas      : 2000      MRN: [id number]  Encounter Provider: NIDHI TURNER MD  Encounter Date: 6/3/2024   Encounter department: [facility] PRIMARY CARE    Assessment & Plan   1. Anxiety and depression  Assessment & Plan:  Doing much better but still anxious so we an increase dose to 100 mg daily  She will message me with update in 4-6 weeks  If needed consider adding Buspar  Orders:  -     sertraline (ZOLOFT) 100 mg tablet; Take 1 tablet (100 mg total) by mouth daily  2. Annual physical exam  Assessment & Plan:  Annual physical exam completed today. Discussed health maintenance topics including immunizations. Risk and benefits of relevant cancer screenings discussed and offered. Encouraged cessation of smoking if applicable. Encouraged healthy diet and exercise 3-5 times per week as tolerated. Reviewed prior labs and ordered labs if due. Repeat annual physical in 1 year.       Immunizations and preventive care screenings were discussed with patient today. Appropriate education was printed on patient's after visit summary.    Counseling:  Alcohol/drug use: discussed moderation in alcohol intake, the recommendations for healthy alcohol use, and avoidance of illicit drug use.  Dental Health: discussed importance of regular tooth brushing, flossing, and dental visits.  Injury prevention: discussed safety/seat belts, safety helmets, smoke detectors, carbon dioxide detectors, and smoking near bedding or upholstery.  Sexual health: discussed sexually transmitted diseases, partner selection, use of condoms, avoidance of unintended pregnancy, and contraceptive alternatives.  Exercise: the importance of regular exercise/physical activity was discussed. Recommend exercise 3-5 times per week for at least 30 minutes.          History of Present Illness     Adult Annual Physical:  Patient presents for annual physical.     Diet and Physical Activity:  -  Diet/Nutrition:. Overeating, eating fast food  - Exercise: no formal exercise.    General Health:  - Sleep: sleeps well.  - Hearing:. No issues  - Vision: no vision problems.  - Dental: regular dental visits.    /GYN Health:  - Follows with GYN: yes.     She feels much better overall with the Zoloft 50 mg daily but still pretty anxious. Denies any side effects except some difficulty sleeping. She still feels like she is overeating and not working out regularly. She plans to go to the gym today so hoping to get into more of a routine.     Review of Systems   Constitutional:  Negative for chills and fever.   HENT:  Negative for congestion, rhinorrhea and sore throat.    Eyes:  Negative for visual disturbance.   Respiratory:  Negative for cough, shortness of breath and stridor.    Cardiovascular:  Negative for chest pain.   Gastrointestinal:  Negative for abdominal distention, constipation, diarrhea, nausea and vomiting.   Endocrine: Negative for polyuria.   Genitourinary:  Negative for dysuria and frequency.   Musculoskeletal:  Negative for back pain.   Skin:  Negative for rash.   Neurological:  Negative for headaches.   Psychiatric/Behavioral:  Positive for dysphoric mood and sleep disturbance. The patient is nervous/anxious.    All other systems reviewed and are negative.    Medical History Reviewed by provider this encounter:       Past Medical History   Past Medical History:   Diagnosis Date    Anxiety     Depression     Heart murmur      Past Surgical History:   Procedure Laterality Date    EYE SURGERY      stye removal     Family History   Problem Relation Age of Onset    Depression Mother     No Known Problems Father     No Known Problems Sister     Diabetes Sister     No Known Problems Brother     No Known Problems Brother     Asthma Brother     No Known Problems Maternal Grandmother     No Known Problems Maternal Grandfather     No Known Problems Paternal Grandmother     No Known Problems Paternal  "Grandfather     Breast cancer Neg Hx     Colon cancer Neg Hx     Ovarian cancer Neg Hx      Current Outpatient Medications on File Prior to Visit   Medication Sig Dispense Refill    [DISCONTINUED] sertraline (ZOLOFT) 25 mg tablet Take 1 tablet (25 mg total) by mouth daily for 14 days, THEN 2 tablets (50 mg total) daily for 14 days. 42 tablet 1     Current Facility-Administered Medications on File Prior to Visit   Medication Dose Route Frequency Provider Last Rate Last Admin    EPINEPHrine (EPIPEN) injection 0.3 mg  0.3 mg Intramuscular Once Ani Monroe PA-C         Allergies   Allergen Reactions    Shrimp (Diagnostic) - Food Allergy Lip Swelling      Current Outpatient Medications on File Prior to Visit   Medication Sig Dispense Refill    [DISCONTINUED] sertraline (ZOLOFT) 25 mg tablet Take 1 tablet (25 mg total) by mouth daily for 14 days, THEN 2 tablets (50 mg total) daily for 14 days. 42 tablet 1     Current Facility-Administered Medications on File Prior to Visit   Medication Dose Route Frequency Provider Last Rate Last Admin    EPINEPHrine (EPIPEN) injection 0.3 mg  0.3 mg Intramuscular Once Ani Monroe PA-C          Social History     Tobacco Use    Smoking status: Never    Smokeless tobacco: Never   Vaping Use    Vaping status: Never Used   Substance and Sexual Activity    Alcohol use: Yes     Comment: 2 x a month    Drug use: No    Sexual activity: Not Currently     Birth control/protection: None     Comment: pt cell phone number is [phone]       Objective     /70 (BP Location: Left arm, Patient Position: Standing, Cuff Size: Standard)   Pulse 86   Temp 98 °F (36.7 °C) (Temporal)   Resp 18   Ht 5' 2\" (1.575 m)   Wt 63.9 kg (140 lb 12.8 oz)   LMP 05/22/2024 (Exact Date)   SpO2 99%   BMI 25.75 kg/m²     Physical Exam  Vitals reviewed.   Constitutional:       General: She is not in acute distress.  HENT:      Right Ear: External ear normal.      Left Ear: External ear normal.      Nose: " Nose normal.      Mouth/Throat:      Mouth: Mucous membranes are moist.   Eyes:      General:         Right eye: No discharge.         Left eye: No discharge.      Extraocular Movements: Extraocular movements intact.      Conjunctiva/sclera: Conjunctivae normal.      Pupils: Pupils are equal, round, and reactive to light.   Cardiovascular:      Rate and Rhythm: Normal rate and regular rhythm.      Heart sounds: No murmur heard.  Pulmonary:      Effort: Pulmonary effort is normal. No respiratory distress.      Breath sounds: No wheezing.   Abdominal:      General: There is no distension.      Palpations: Abdomen is soft.      Tenderness: There is no abdominal tenderness.   Musculoskeletal:      Cervical back: Normal range of motion.      Right lower leg: No edema.      Left lower leg: No edema.   Skin:     Coloration: Skin is not pale.      Findings: No rash.   Neurological:      Mental Status: She is alert and oriented to person, place, and time.   Psychiatric:         Mood and Affect: Mood normal.         Behavior: Behavior normal.

## 2024-06-25 DIAGNOSIS — F41.9 ANXIETY AND DEPRESSION: ICD-10-CM

## 2024-06-25 DIAGNOSIS — F32.A ANXIETY AND DEPRESSION: ICD-10-CM

## 2024-06-25 RX ORDER — SERTRALINE HYDROCHLORIDE 100 MG/1
100 TABLET, FILM COATED ORAL DAILY
Qty: 90 TABLET | Refills: 0 | Status: SHIPPED | OUTPATIENT
Start: 2024-06-25

## 2024-06-26 ENCOUNTER — PATIENT MESSAGE (OUTPATIENT)
Dept: FAMILY MEDICINE CLINIC | Facility: CLINIC | Age: 24
End: 2024-06-26

## 2024-06-26 DIAGNOSIS — F41.9 ANXIETY AND DEPRESSION: Primary | ICD-10-CM

## 2024-06-26 DIAGNOSIS — F32.A ANXIETY AND DEPRESSION: Primary | ICD-10-CM

## 2024-06-26 RX ORDER — BUSPIRONE HYDROCHLORIDE 5 MG/1
5 TABLET ORAL 2 TIMES DAILY
Qty: 60 TABLET | Refills: 5 | Status: SHIPPED | OUTPATIENT
Start: 2024-06-26

## 2024-07-18 DIAGNOSIS — F32.A ANXIETY AND DEPRESSION: ICD-10-CM

## 2024-07-18 DIAGNOSIS — F41.9 ANXIETY AND DEPRESSION: ICD-10-CM

## 2024-07-18 RX ORDER — BUSPIRONE HYDROCHLORIDE 5 MG/1
5 TABLET ORAL 2 TIMES DAILY
Qty: 180 TABLET | Refills: 1 | Status: SHIPPED | OUTPATIENT
Start: 2024-07-18

## 2024-07-18 RX ORDER — SERTRALINE HYDROCHLORIDE 25 MG/1
TABLET, FILM COATED ORAL
Qty: 42 TABLET | Refills: 1 | OUTPATIENT
Start: 2024-07-18

## 2024-09-24 DIAGNOSIS — F32.A ANXIETY AND DEPRESSION: ICD-10-CM

## 2024-09-24 DIAGNOSIS — F41.9 ANXIETY AND DEPRESSION: ICD-10-CM

## 2024-09-24 RX ORDER — SERTRALINE HYDROCHLORIDE 100 MG/1
100 TABLET, FILM COATED ORAL DAILY
Qty: 90 TABLET | Refills: 1 | Status: SHIPPED | OUTPATIENT
Start: 2024-09-24

## 2024-10-17 ENCOUNTER — TELEPHONE (OUTPATIENT)
Age: 24
End: 2024-10-17

## 2024-10-17 DIAGNOSIS — F41.9 ANXIETY AND DEPRESSION: ICD-10-CM

## 2024-10-17 DIAGNOSIS — F32.A ANXIETY AND DEPRESSION: ICD-10-CM

## 2024-10-17 RX ORDER — BUSPIRONE HYDROCHLORIDE 5 MG/1
5 TABLET ORAL 2 TIMES DAILY
Qty: 180 TABLET | Refills: 1 | Status: SHIPPED | OUTPATIENT
Start: 2024-10-17

## 2024-10-17 NOTE — TELEPHONE ENCOUNTER
Left message for patient to call to schedule an appointment.Offered a few times tomorrow and Mon.  Please put patient thru to the office

## 2024-11-04 ENCOUNTER — OFFICE VISIT (OUTPATIENT)
Age: 24
End: 2024-11-04
Payer: COMMERCIAL

## 2024-11-04 VITALS
DIASTOLIC BLOOD PRESSURE: 62 MMHG | OXYGEN SATURATION: 99 % | WEIGHT: 165 LBS | HEIGHT: 62 IN | BODY MASS INDEX: 30.36 KG/M2 | HEART RATE: 79 BPM | TEMPERATURE: 96.9 F | SYSTOLIC BLOOD PRESSURE: 102 MMHG | RESPIRATION RATE: 16 BRPM

## 2024-11-04 DIAGNOSIS — F41.9 ANXIETY AND DEPRESSION: ICD-10-CM

## 2024-11-04 DIAGNOSIS — Z23 ENCOUNTER FOR ADMINISTRATION OF VACCINE: ICD-10-CM

## 2024-11-04 DIAGNOSIS — E66.811 CLASS 1 OBESITY WITHOUT SERIOUS COMORBIDITY WITH BODY MASS INDEX (BMI) OF 30.0 TO 30.9 IN ADULT, UNSPECIFIED OBESITY TYPE: ICD-10-CM

## 2024-11-04 DIAGNOSIS — F32.A ANXIETY AND DEPRESSION: ICD-10-CM

## 2024-11-04 DIAGNOSIS — T78.2XXS ANAPHYLAXIS, SEQUELA: ICD-10-CM

## 2024-11-04 DIAGNOSIS — F41.9 ANXIETY: Primary | ICD-10-CM

## 2024-11-04 PROCEDURE — 99213 OFFICE O/P EST LOW 20 MIN: CPT | Performed by: INTERNAL MEDICINE

## 2024-11-04 PROCEDURE — 90673 RIV3 VACCINE NO PRESERV IM: CPT | Performed by: INTERNAL MEDICINE

## 2024-11-04 PROCEDURE — 90471 IMMUNIZATION ADMIN: CPT | Performed by: INTERNAL MEDICINE

## 2024-11-04 RX ORDER — ALPRAZOLAM 0.25 MG/1
0.25 TABLET ORAL 2 TIMES DAILY PRN
Qty: 10 TABLET | Refills: 0 | Status: SHIPPED | OUTPATIENT
Start: 2024-11-04

## 2024-11-04 RX ORDER — EPINEPHRINE 0.3 MG/.3ML
0.3 INJECTION SUBCUTANEOUS ONCE
Qty: 0.6 ML | Refills: 0 | Status: SHIPPED | OUTPATIENT
Start: 2024-11-04 | End: 2024-11-04

## 2024-11-04 RX ORDER — BUSPIRONE HYDROCHLORIDE 5 MG/1
5 TABLET ORAL 3 TIMES DAILY
Start: 2024-11-04

## 2024-11-04 NOTE — ASSESSMENT & PLAN NOTE
Will uptitrate her BuSpar to 5 mg 3 times a day, uptitrate Zoloft to  150 mg daily  Will give Xanax as needed for anxiety while at work.  Patient counseled about addictive potential and associated sedative effects.  Patient verbalized understanding  Follow-up in 4 weeks    Orders:    busPIRone (BUSPAR) 5 mg tablet; Take 1 tablet (5 mg total) by mouth 3 (three) times a day    sertraline (ZOLOFT) 50 mg tablet; Take 1 tablet (50 mg total) by mouth daily

## 2024-11-04 NOTE — PROGRESS NOTES
Ambulatory Visit  Name: Shelly Venegas      : 2000      MRN: [id number]  Encounter Provider: Alexandro Oviedo MD  Encounter Date: 2024   Encounter department: [facility] PRIMARY CARE    Assessment & Plan  Anxiety  Increase BuSpar to 5 mg 3 times daily, will add as needed Xanax.  Orders:    sertraline (ZOLOFT) 50 mg tablet; Take 1 tablet (50 mg total) by mouth daily    ALPRAZolam (XANAX) 0.25 mg tablet; Take 1 tablet (0.25 mg total) by mouth 2 (two) times a day as needed for anxiety    Class 1 obesity without serious comorbidity with body mass index (BMI) of 30.0 to 30.9 in adult, unspecified obesity type    Discussed dietary modification         Anxiety and depression  Will uptitrate her BuSpar to 5 mg 3 times a day, uptitrate Zoloft to  150 mg daily  Will give Xanax as needed for anxiety while at work.  Patient counseled about addictive potential and associated sedative effects.  Patient verbalized understanding  Follow-up in 4 weeks    Orders:    busPIRone (BUSPAR) 5 mg tablet; Take 1 tablet (5 mg total) by mouth 3 (three) times a day    sertraline (ZOLOFT) 50 mg tablet; Take 1 tablet (50 mg total) by mouth daily    Anaphylaxis, sequela  Needs refills of EpiPen for shellfish allergy  Orders:    EPINEPHrine (EPIPEN) 0.3 mg/0.3 mL SOAJ; Inject 0.3 mL (0.3 mg total) into a muscle once for 1 dose    Encounter for administration of vaccine    Orders:    Flublok (recombinant) 0.5 mL IM       History of Present Illness     HPI    Patient comes for follow-up of anxiety and depression.  Zoloft and BuSpar has helped, but patient still continues to have dysphoric mood and anxiety, impairing with her daily work.  Request uptitration of her medications.        Review of Systems   Constitutional:  Negative for appetite change, chills, diaphoresis, fatigue, fever and unexpected weight change.   Respiratory:  Negative for apnea, cough, choking, chest tightness, shortness of breath, wheezing and stridor.     Cardiovascular:  Negative for chest pain, palpitations and leg swelling.   Gastrointestinal:  Negative for abdominal distention, abdominal pain, anal bleeding, blood in stool, constipation, diarrhea, nausea and vomiting.   Genitourinary:  Negative for decreased urine volume, difficulty urinating, frequency and urgency.   Musculoskeletal:  Negative for arthralgias, back pain and myalgias.   Neurological:  Negative for dizziness, light-headedness, numbness and headaches.   Psychiatric/Behavioral:  Positive for dysphoric mood and sleep disturbance. Negative for decreased concentration, hallucinations, self-injury and suicidal ideas. The patient is nervous/anxious. The patient is not hyperactive.      Medical History Reviewed by provider this encounter:  Tobacco  Allergies  Meds  Problems  Med Hx  Surg Hx  Fam Hx       Past Medical History   Past Medical History:   Diagnosis Date    Anxiety     Depression     Heart murmur      Past Surgical History:   Procedure Laterality Date    EYE SURGERY      stye removal     Family History   Problem Relation Age of Onset    Depression Mother     No Known Problems Father     No Known Problems Sister     Diabetes Sister     No Known Problems Brother     No Known Problems Brother     Asthma Brother     No Known Problems Maternal Grandmother     No Known Problems Maternal Grandfather     No Known Problems Paternal Grandmother     No Known Problems Paternal Grandfather     Breast cancer Neg Hx     Colon cancer Neg Hx     Ovarian cancer Neg Hx      Current Outpatient Medications on File Prior to Visit   Medication Sig Dispense Refill    sertraline (ZOLOFT) 100 mg tablet TAKE 1 TABLET BY MOUTH EVERY DAY 90 tablet 1    [DISCONTINUED] busPIRone (BUSPAR) 5 mg tablet Take 1 tablet (5 mg total) by mouth 2 (two) times a day 180 tablet 1     Current Facility-Administered Medications on File Prior to Visit   Medication Dose Route Frequency Provider Last Rate Last Admin    EPINEPHrine  "(EPIPEN) injection 0.3 mg  0.3 mg Intramuscular Once Ani Monroe PA-C         Allergies   Allergen Reactions    Shrimp (Diagnostic) - Food Allergy Lip Swelling      Current Outpatient Medications on File Prior to Visit   Medication Sig Dispense Refill    sertraline (ZOLOFT) 100 mg tablet TAKE 1 TABLET BY MOUTH EVERY DAY 90 tablet 1    [DISCONTINUED] busPIRone (BUSPAR) 5 mg tablet Take 1 tablet (5 mg total) by mouth 2 (two) times a day 180 tablet 1     Current Facility-Administered Medications on File Prior to Visit   Medication Dose Route Frequency Provider Last Rate Last Admin    EPINEPHrine (EPIPEN) injection 0.3 mg  0.3 mg Intramuscular Once Ani Monroe PA-C          Social History     Tobacco Use    Smoking status: Never     Passive exposure: Past    Smokeless tobacco: Never   Vaping Use    Vaping status: Never Used   Substance and Sexual Activity    Alcohol use: Yes     Comment: 2 x a month    Drug use: No    Sexual activity: Not Currently     Birth control/protection: None     Comment: pt cell phone number is [phone]         Objective     /62 (BP Location: Left arm, Patient Position: Sitting, Cuff Size: Standard)   Pulse 79   Temp (!) 96.9 °F (36.1 °C) (Tympanic)   Resp 16   Ht 5' 2\" (1.575 m)   Wt 74.8 kg (165 lb)   SpO2 99%   BMI 30.18 kg/m²     Physical Exam  Constitutional:       General: She is not in acute distress.     Appearance: Normal appearance. She is normal weight. She is not ill-appearing, toxic-appearing or diaphoretic.   Cardiovascular:      Rate and Rhythm: Normal rate and regular rhythm.      Pulses: Normal pulses.      Heart sounds: Normal heart sounds. No murmur heard.     No gallop.   Pulmonary:      Effort: Pulmonary effort is normal. No respiratory distress.      Breath sounds: Normal breath sounds. No stridor. No wheezing, rhonchi or rales.   Chest:      Chest wall: No tenderness.   Neurological:      Mental Status: She is alert and oriented to person, place, and " time.   Psychiatric:         Mood and Affect: Mood is anxious.

## 2024-12-06 ENCOUNTER — OFFICE VISIT (OUTPATIENT)
Age: 24
End: 2024-12-06
Payer: COMMERCIAL

## 2024-12-06 VITALS
HEIGHT: 62 IN | WEIGHT: 162 LBS | TEMPERATURE: 98.5 F | HEART RATE: 75 BPM | DIASTOLIC BLOOD PRESSURE: 72 MMHG | RESPIRATION RATE: 16 BRPM | SYSTOLIC BLOOD PRESSURE: 124 MMHG | BODY MASS INDEX: 29.81 KG/M2 | OXYGEN SATURATION: 98 %

## 2024-12-06 DIAGNOSIS — Z00.00 ANNUAL PHYSICAL EXAM: Primary | ICD-10-CM

## 2024-12-06 DIAGNOSIS — F32.A ANXIETY AND DEPRESSION: ICD-10-CM

## 2024-12-06 DIAGNOSIS — F41.9 ANXIETY AND DEPRESSION: ICD-10-CM

## 2024-12-06 PROCEDURE — 99395 PREV VISIT EST AGE 18-39: CPT | Performed by: INTERNAL MEDICINE

## 2024-12-06 RX ORDER — BUPROPION HYDROCHLORIDE 150 MG/1
150 TABLET ORAL EVERY MORNING
Qty: 90 TABLET | Refills: 0 | Status: SHIPPED | OUTPATIENT
Start: 2024-12-06 | End: 2025-06-04

## 2024-12-06 NOTE — PROGRESS NOTES
Adult Annual Physical  Name: Shelly Venegas      : 2000      MRN: [id number]  Encounter Provider: Alexandro Oviedo MD  Encounter Date: 2024   Encounter department: [facility] PRIMARY CARE    Assessment & Plan  Annual physical exam         Anxiety and depression    Given patient concerns of fatigue, weight gain, will switch to Wellbutrin, discussed side effects.  Patient has already self discontinued Zoloft about a week ago, follow-up in 3 months or earlier if needed.  Depression Screening Follow-up Plan: Patient's depression screening was positive with a PHQ-9 score of 14.  Pt did not tolerate zoloft and meds were adjusted    Orders:    buPROPion (WELLBUTRIN XL) 150 mg 24 hr tablet; Take 1 tablet (150 mg total) by mouth every morning    Immunizations and preventive care screenings were discussed with patient today. Appropriate education was printed on patient's after visit summary.    Counseling:  Exercise: the importance of regular exercise/physical activity was discussed. Recommend exercise 3-5 times per week for at least 30 minutes.       Depression Screening and Follow-up Plan: Patient's depression screening was positive with a PHQ-9 score of 14. Patient with underlying depression and was advised to continue current medications as prescribed. Yes        History of Present Illness       Patient reports inability to tolerate Zoloft due to feeling fatigue and also concerns of her weight gain.  She self discontinued the medication about a week ago.  Continues to have depression and anxiety.   Adult Annual Physical:  Patient presents for annual physical.     Diet and Physical Activity:  - Diet/Nutrition: poor diet. always feels hungry  - Exercise: walking and 1-2 times a week on average. feels tired    Depression Screening:    - PHQ-9 Score: 14    General Health:  - Sleep: sleeps poorly, 4-6 hours of sleep on average and snores loudly.  - Hearing: normal hearing bilateral ears.  - Vision:  no vision problems.  - Dental: regular dental visits and brushes teeth twice daily.    /GYN Health:  - Follows with GYN: yes.   - Last menstrual cycle: 11/11/2024.   - History of STDs: no  - Contraception:. not using birth control      Advanced Care Planning:  - Has an advanced directive?: no    - Has a durable medical POA?: no    - ACP document given to patient?: no      Review of Systems   Constitutional:  Negative for appetite change, chills, diaphoresis, fatigue, fever and unexpected weight change.   Respiratory:  Negative for apnea, cough, choking, chest tightness, shortness of breath, wheezing and stridor.    Cardiovascular:  Negative for chest pain, palpitations and leg swelling.   Gastrointestinal:  Negative for abdominal distention, abdominal pain, anal bleeding, blood in stool, constipation, diarrhea, nausea and vomiting.   Genitourinary:  Negative for decreased urine volume, difficulty urinating, frequency and urgency.   Musculoskeletal:  Negative for arthralgias, back pain and myalgias.   Neurological:  Negative for dizziness, light-headedness, numbness and headaches.   Psychiatric/Behavioral:  Positive for dysphoric mood. Negative for self-injury, sleep disturbance and suicidal ideas. The patient is nervous/anxious.      Medical History Reviewed by provider this encounter:  Tobacco  Allergies  Meds  Problems  Med Hx  Surg Hx  Fam Hx     .  Past Medical History   Past Medical History:   Diagnosis Date    Anxiety     Depression     Heart murmur      Past Surgical History:   Procedure Laterality Date    EYE SURGERY      stye removal     Family History   Problem Relation Age of Onset    Depression Mother     No Known Problems Father     No Known Problems Sister     Diabetes Sister     No Known Problems Brother     No Known Problems Brother     Asthma Brother     No Known Problems Maternal Grandmother     No Known Problems Maternal Grandfather     No Known Problems Paternal Grandmother     No Known  Problems Paternal Grandfather     Breast cancer Neg Hx     Colon cancer Neg Hx     Ovarian cancer Neg Hx       reports that she has never smoked. She has been exposed to tobacco smoke. She has never used smokeless tobacco. She reports current alcohol use. She reports that she does not use drugs.  Current Outpatient Medications on File Prior to Visit   Medication Sig Dispense Refill    ALPRAZolam (XANAX) 0.25 mg tablet Take 1 tablet (0.25 mg total) by mouth 2 (two) times a day as needed for anxiety 10 tablet 0    busPIRone (BUSPAR) 5 mg tablet Take 1 tablet (5 mg total) by mouth 3 (three) times a day      EPINEPHrine (EPIPEN) 0.3 mg/0.3 mL SOAJ Inject 0.3 mL (0.3 mg total) into a muscle once for 1 dose 0.6 mL 0    [DISCONTINUED] sertraline (ZOLOFT) 100 mg tablet TAKE 1 TABLET BY MOUTH EVERY DAY 90 tablet 1    [DISCONTINUED] sertraline (ZOLOFT) 50 mg tablet Take 1 tablet (50 mg total) by mouth daily 90 tablet 0     Current Facility-Administered Medications on File Prior to Visit   Medication Dose Route Frequency Provider Last Rate Last Admin    EPINEPHrine (EPIPEN) injection 0.3 mg  0.3 mg Intramuscular Once Ani Monreo PA-C         Allergies   Allergen Reactions    Shrimp (Diagnostic) - Food Allergy Lip Swelling      Current Outpatient Medications on File Prior to Visit   Medication Sig Dispense Refill    ALPRAZolam (XANAX) 0.25 mg tablet Take 1 tablet (0.25 mg total) by mouth 2 (two) times a day as needed for anxiety 10 tablet 0    busPIRone (BUSPAR) 5 mg tablet Take 1 tablet (5 mg total) by mouth 3 (three) times a day      EPINEPHrine (EPIPEN) 0.3 mg/0.3 mL SOAJ Inject 0.3 mL (0.3 mg total) into a muscle once for 1 dose 0.6 mL 0    [DISCONTINUED] sertraline (ZOLOFT) 100 mg tablet TAKE 1 TABLET BY MOUTH EVERY DAY 90 tablet 1    [DISCONTINUED] sertraline (ZOLOFT) 50 mg tablet Take 1 tablet (50 mg total) by mouth daily 90 tablet 0     Current Facility-Administered Medications on File Prior to Visit   Medication Dose  "Route Frequency Provider Last Rate Last Admin    EPINEPHrine (EPIPEN) injection 0.3 mg  0.3 mg Intramuscular Once Ani Monroe PA-C          Social History     Tobacco Use    Smoking status: Never     Passive exposure: Past    Smokeless tobacco: Never   Vaping Use    Vaping status: Never Used   Substance and Sexual Activity    Alcohol use: Yes     Comment: 2 x a month    Drug use: No    Sexual activity: Not Currently     Birth control/protection: None     Comment: pt cell phone number is [phone]       Objective   /72 (BP Location: Left arm, Patient Position: Sitting, Cuff Size: Standard)   Pulse 75   Temp 98.5 °F (36.9 °C) (Tympanic)   Resp 16   Ht 5' 2\" (1.575 m)   Wt 73.5 kg (162 lb)   SpO2 98%   BMI 29.63 kg/m²     Physical Exam  Constitutional:       General: She is not in acute distress.     Appearance: Normal appearance. She is normal weight. She is not ill-appearing, toxic-appearing or diaphoretic.   Cardiovascular:      Rate and Rhythm: Normal rate and regular rhythm.      Pulses: Normal pulses.      Heart sounds: Normal heart sounds. No murmur heard.     No gallop.   Pulmonary:      Effort: Pulmonary effort is normal. No respiratory distress.      Breath sounds: Normal breath sounds. No stridor. No wheezing, rhonchi or rales.   Chest:      Chest wall: No tenderness.   Abdominal:      General: There is no distension.      Palpations: Abdomen is soft.      Tenderness: There is no abdominal tenderness. There is no guarding.   Neurological:      Mental Status: She is alert and oriented to person, place, and time.         "

## 2024-12-06 NOTE — ASSESSMENT & PLAN NOTE
Given patient concerns of fatigue, weight gain, will switch to Wellbutrin, discussed side effects.  Patient has already self discontinued Zoloft about a week ago, follow-up in 3 months or earlier if needed.  Depression Screening Follow-up Plan: Patient's depression screening was positive with a PHQ-9 score of 14. Pt did not tolerate zoloft and meds were adjusted    Orders:    buPROPion (WELLBUTRIN XL) 150 mg 24 hr tablet; Take 1 tablet (150 mg total) by mouth every morning

## 2024-12-06 NOTE — PATIENT INSTRUCTIONS
"Patient Education     Routine physical for adults   The Basics   Written by the doctors and editors at [facility]   What is a physical? -- A physical is a routine visit, or \"check-up,\" with your doctor. You might also hear it called a \"wellness visit\" or \"preventive visit.\"  During each visit, the doctor will:   Ask about your physical and mental health   Ask about your habits, behaviors, and lifestyle   Do an exam   Give you vaccines if needed   Talk to you about any medicines you take   Give advice about your health   Answer your questions  Getting regular check-ups is an important part of taking care of your health. It can help your doctor find and treat any problems you have. But it's also important for preventing health problems.  A routine physical is different from a \"sick visit.\" A sick visit is when you see a doctor because of a health concern or problem. Since physicals are scheduled ahead of time, you can think about what you want to ask the doctor.  How often should I get a physical? -- It depends on your age and health. In general, for people age 21 years and older:   If you are younger than 50 years, you might be able to get a physical every 3 years.   If you are 50 years or older, your doctor might recommend a physical every year.  If you have an ongoing health condition, like diabetes or high blood pressure, your doctor will probably want to see you more often.  What happens during a physical? -- In general, each visit will include:   Physical exam - The doctor or nurse will check your height, weight, heart rate, and blood pressure. They will also look at your eyes and ears. They will ask about how you are feeling and whether you have any symptoms that bother you.   Medicines - It's a good idea to bring a list of all the medicines you take to each doctor visit. Your doctor will talk to you about your medicines and answer any questions. Tell them if you are having any side effects that bother you. You " "should also tell them if you are having trouble paying for any of your medicines.   Habits and behaviors - This includes:   Your diet   Your exercise habits   Whether you smoke, drink alcohol, or use drugs   Whether you are sexually active   Whether you feel safe at home  Your doctor will talk to you about things you can do to improve your health and lower your risk of health problems. They will also offer help and support. For example, if you want to quit smoking, they can give you advice and might prescribe medicines. If you want to improve your diet or get more physical activity, they can help you with this, too.   Lab tests, if needed - The tests you get will depend on your age and situation. For example, your doctor might want to check your:   Cholesterol   Blood sugar   Iron level   Vaccines - The recommended vaccines will depend on your age, health, and what vaccines you already had. Vaccines are very important because they can prevent certain serious or deadly infections.   Discussion of screening - \"Screening\" means checking for diseases or other health problems before they cause symptoms. Your doctor can recommend screening based on your age, risk, and preferences. This might include tests to check for:   Cancer, such as breast, prostate, cervical, ovarian, colorectal, prostate, lung, or skin cancer   Sexually transmitted infections, such as chlamydia and gonorrhea   Mental health conditions like depression and anxiety  Your doctor will talk to you about the different types of screening tests. They can help you decide which screenings to have. They can also explain what the results might mean.   Answering questions - The physical is a good time to ask the doctor or nurse questions about your health. If needed, they can refer you to other doctors or specialists, too.  Adults older than 65 years often need other care, too. As you get older, your doctor will talk to you about:   How to prevent falling at " home   Hearing or vision tests   Memory testing   How to take your medicines safely   Making sure that you have the help and support you need at home  All topics are updated as new evidence becomes available and our peer review process is complete.  This topic retrieved from [employer] on: May 02, 2024.  Topic 647808 Version 1.0  Release: 32.4.3 - C32.122  © 2024 UpToDate, Inc. and/or its affiliates. All rights reserved.  Consumer Information Use and Disclaimer   Disclaimer: This generalized information is a limited summary of diagnosis, treatment, and/or medication information. It is not meant to be comprehensive and should be used as a tool to help the user understand and/or assess potential diagnostic and treatment options. It does NOT include all information about conditions, treatments, medications, side effects, or risks that may apply to a specific patient. It is not intended to be medical advice or a substitute for the medical advice, diagnosis, or treatment of a health care provider based on the health care provider's examination and assessment of a patient's specific and unique circumstances. Patients must speak with a health care provider for complete information about their health, medical questions, and treatment options, including any risks or benefits regarding use of medications. This information does not endorse any treatments or medications as safe, effective, or approved for treating a specific patient. UpToDate, Inc. and its affiliates disclaim any warranty or liability relating to this information or the use thereof.The use of this information is governed by the Terms of Use, available at https://www.woltersSpot Coffeeuwer.com/en/know/clinical-effectiveness-terms. 2024© UpToDate, Inc. and its affiliates and/or licensors. All rights reserved.  Copyright   © 2024 UpToDate, Inc. and/or its affiliates. All rights reserved.

## 2024-12-24 ENCOUNTER — OFFICE VISIT (OUTPATIENT)
Dept: BARIATRICS | Facility: CLINIC | Age: 24
End: 2024-12-24
Payer: COMMERCIAL

## 2024-12-24 VITALS
WEIGHT: 168.2 LBS | DIASTOLIC BLOOD PRESSURE: 70 MMHG | HEART RATE: 74 BPM | BODY MASS INDEX: 31.75 KG/M2 | OXYGEN SATURATION: 99 % | HEIGHT: 61 IN | SYSTOLIC BLOOD PRESSURE: 124 MMHG

## 2024-12-24 DIAGNOSIS — F41.9 ANXIETY AND DEPRESSION: ICD-10-CM

## 2024-12-24 DIAGNOSIS — E66.09 CLASS 1 OBESITY DUE TO EXCESS CALORIES WITH SERIOUS COMORBIDITY AND BODY MASS INDEX (BMI) OF 31.0 TO 31.9 IN ADULT: Primary | ICD-10-CM

## 2024-12-24 DIAGNOSIS — E16.2 HYPOGLYCEMIA: ICD-10-CM

## 2024-12-24 DIAGNOSIS — E66.811 CLASS 1 OBESITY DUE TO EXCESS CALORIES WITH SERIOUS COMORBIDITY AND BODY MASS INDEX (BMI) OF 31.0 TO 31.9 IN ADULT: Primary | ICD-10-CM

## 2024-12-24 DIAGNOSIS — F32.A ANXIETY AND DEPRESSION: ICD-10-CM

## 2024-12-24 PROCEDURE — 99204 OFFICE O/P NEW MOD 45 MIN: CPT | Performed by: NURSE PRACTITIONER

## 2024-12-24 RX ORDER — BUPROPION HYDROCHLORIDE 150 MG/1
TABLET ORAL
Qty: 180 TABLET | Refills: 0 | Status: SHIPPED | OUTPATIENT
Start: 2024-12-24

## 2024-12-24 RX ORDER — NALTREXONE HYDROCHLORIDE 50 MG/1
TABLET, FILM COATED ORAL
Qty: 30 TABLET | Refills: 2 | Status: SHIPPED | OUTPATIENT
Start: 2024-12-24

## 2024-12-24 NOTE — PROGRESS NOTES
Assessment/Plan:    Class 1 obesity due to excess calories with serious comorbidity and body mass index (BMI) of 31.0 to 31.9 in adult  - Discussed options of HealthyCORE-Intensive Lifestyle Intervention Program, Very Low Calorie Diet-VLCD, and Conservative Program and the role of weight loss medications.  - Patient is interested in pursuing Conservative Program and follow up visits with medical weight management provider.  - Explained the importance of making lifestyle changes in addition to starting any anti-obesity medications.   - Initial weight loss goal of 5-10% weight loss for improved health. Weight loss can improve patient's co-morbid conditions and/or prevent weight-related complications.  - Weight is not at goal and patient has been unable to achieve a meaningful weight loss above 5% using various programs and tools for more than 6 months  - Labs reviewed from 5/2024    General Recommendations:  Nutrition:  Eat breakfast daily.  Do not skip meals.      Food log (ie.) www.[place] [place].com, sparkpeople.com, loseit.com, calorieking.com, etc.     Practice mindful eating.  Be sure to set aside time to eat, eat slowly, and savor your food.     Hydration:    At least 64oz of water daily.  No sugar sweetened beverages.  No juice (eat the fruit instead).     Exercise:  Studies have shown that the ideal exercise goal is somewhere between 150 to 300 minutes of moderate intensity exercise a week.  Start with exercising 10 minutes every other day and gradually increase physical activity with a goal of at least 150 minutes of moderate intensity exercise a week, divided over at least 3 days a week.  An example of this would be exercising 30 minutes a day, 5 days a week.  Resistance training can increase muscle mass and increase our resting metabolic rate.   FULL BODY resistance training is recommended 2-3 times a week.  Do not do this on consecutive days to allow for muscle recovery.     Aim for a bare minimum 5000  steps, even on days you do not exercise.     Monitoring:   Weigh yourself daily.  If this causes undue stress, then just weigh yourself once a week.  Weigh yourself the same time of the day with the same amount of clothing on.  Preferably this should be done after waking up, before you eat, and with no clothing or minimal clothing on.     Specific Goals:  Calorie goal:  8622-6491 bertha/day (Provided with meal plan to follow)   Patient lifestyle habits were reviewed and barriers to weight loss were addressed today.  Nutrition was discussed and patient was strongly encouraged to meet with the dietitian to better balance her nutrition and make sure she is meeting all of her nutrition requirements throughout the day.  She is to start tracking her food and measuring out her portions to see where her current nutrition stands.  She was advised to stay within the calorie range of 1000 to 1200 bertha/day and a protein goal of 70 to 80 g of protein daily.  Activity was discussed and she was encouraged to continue with her current exercise routine and consider some strength training.  Medications were discussed:  GLP-1 medications were discussed and may be considered in the future, patient will find out if she has coverage for these medications  Phentermine to be avoided due to anxiety  Topiramate was discussed to help with appetite and patient denies any history of kidney stones  Bupropion/naltrexone was discussed and patient is currently on bupropion for anxiety.  The dose of bupropion was increased to 150 mg twice daily and naltrexone was added to help with food cravings and emotional behaviors associated with eating.  She will meet with a dietitian and see if the combination of the talk therapy and bupropion/naltrexone will help get her weight back on track.  If not we will follow-up in the office in 2 to 3 months to evaluate if an injectable medication may be more appropriate.  Patient was in agreement with this treatment plan  and will follow-up in the office.    Hypoglycemia  Labs stable  Will monitor cautiously if a GLP-1 is started    Anxiety and depression  Currently on bupropion 150 mg daily  Will increase to 150 mg twice daily to better target cravings         Shelly was seen today for consult.    Diagnoses and all orders for this visit:    Class 1 obesity due to excess calories with serious comorbidity and body mass index (BMI) of 31.0 to 31.9 in adult  -     buPROPion (Wellbutrin XL) 150 mg 24 hr tablet; Take 1 tablet once daily in the evening then after 1 week increase to 1 tablet twice daily in the morning and evening  -     naltrexone (REVIA) 50 mg tablet; Take 1/2 tablet (25mg) once daily in the morning then after 1 week increase to 1/2 tablet (25mg) twice daily in the morning and evening    Hypoglycemia    Anxiety and depression         Medication consent was reviewed today and all questions were answered.  Patient agreed with all bulleted points.  Consent was signed, scanned into chart and patient was provided with a copy for their records.    Patient denies personal history of pancreatitis. Patient also denies personal and family history of medullary thyroid cancer and multiple endocrine neoplasia type 2 (MEN 2 tumor). Patient denies any history of kidney stones, seizures, or glaucoma.       Total time spent reviewing chart, interviewing patient, examining patient, discussing plan, answering all questions, and documentin min, with >50% face-to-face time spent counseling patient on nonsurgical interventions for the treatment of excess weight. Discussed in detail nonsurgical options including intensive lifestyle intervention program, very low-calorie diet program and conservative program.  Discussed the role of weight loss medications.  Counseled patient on diet behavior and exercise modification for weight loss.    Follow up in approximately 2 months with Non-Surgical Physician/Advanced Practitioner.    Subjective:    Chief Complaint   Patient presents with    Consult     Pt is here for MWM consult       Patient ID: Shelly Venegas  is a 24 y.o. female with excess weight/obesity here to pursue weight management.  Previous notes and records have been reviewed.    Past Medical History:   Diagnosis Date    Anxiety     Depression     Heart murmur      Past Surgical History:   Procedure Laterality Date    EYE SURGERY      stye removal       HPI:  Wt Readings from Last 20 Encounters:   12/24/24 76.3 kg (168 lb 3.2 oz)   12/06/24 73.5 kg (162 lb)   11/04/24 74.8 kg (165 lb)   06/03/24 63.9 kg (140 lb 12.8 oz)   05/24/24 62.6 kg (138 lb)   05/08/24 63 kg (139 lb)   05/06/24 63.7 kg (140 lb 6.4 oz)   07/10/23 60.5 kg (133 lb 6.4 oz)   04/25/23 61.2 kg (135 lb)   11/17/22 60.2 kg (132 lb 12.8 oz)   06/07/22 61.6 kg (135 lb 12.8 oz)   04/19/22 60.9 kg (134 lb 3.2 oz)   01/18/22 62.6 kg (138 lb)   12/25/21 61.7 kg (136 lb)   08/10/21 61.7 kg (136 lb)   05/10/21 61.7 kg (136 lb)   04/30/21 61.2 kg (135 lb)   01/14/20 64.4 kg (142 lb) (75%, Z= 0.67)*   01/21/19 74.1 kg (163 lb 6.4 oz) (91%, Z= 1.36)*   02/26/18 73 kg (160 lb 15 oz) (91%, Z= 1.36)*     * Growth percentiles are based on CDC (Girls, 2-20 Years) data.       Patient presents today to medical weight management office for consult.  Patient was started on Zoloft this summer and gained 30 to 40 pounds since starting this therapy.  She was recently switched from Zoloft to Wellbutrin to help control her anxiety and depression.  She feels the bupropion has been helpful for her anxiety and does think it may be helping her cravings.  Patient admits that her emotional relationship with food is what is likely causing her weight gain.  Patient states she treats her anxiety and depression with food.  She feels when she is not able to eat she gets more anxious and her blood sugar drops.  She often eats when she is not hungry due to this fear.  Patient states her periods are always regular.  Her  mother does struggle with her weight.  Patient does admit that she has chronic constipation and struggles with regular bowel movements.    Starting MWM weight: 168.2 lbs  Starting BMI: 31.78  Starting Waist Measurement: 36 in  Goal weight: 130 lbs    Obesity/Excess Weight:  Severity: Mild  Onset:  last 4-6 months    Modifiers: Diet and Exercise  Contributing factors: Poor Food Choices, Insufficient Physical Activity, Stress/Emotional Eating, Binge Eating, Lack of knowledge of appropriate lifestyle changes, Medications, Depression, and Insufficient time to make appropriate lifestyle changes  Associated symptoms: comorbid conditions, fatigue, body image issues, decreased self esteem, decreased mobility, depression, and clothes do not fit    Diet recall:  B: sausage and eggs OR oatmeal  L: salad with grilled chicken  S: chips OR granola bars  D: chicken breast and vegetables  S: has stopped  Take out frequency: 1-2 times (recently cut back)    Hydration: poppi drink, water, ACV in warm water  Alcohol: no  Smoking: no  Exercise: treadmill for 1 hour - 3 days per week  Occupation: LPN   Sleep: works evening - sleeps 6 hours per night  STOP ban/8      The following portions of the patient's history were reviewed and updated as appropriate: allergies, current medications, past family history, past medical history, past social history, past surgical history, and problem list.    Family History   Problem Relation Age of Onset    Depression Mother     No Known Problems Father     No Known Problems Sister     Diabetes type I Sister     No Known Problems Brother     No Known Problems Brother     Asthma Brother     No Known Problems Maternal Grandmother     No Known Problems Maternal Grandfather     No Known Problems Paternal Grandmother     No Known Problems Paternal Grandfather     Breast cancer Neg Hx     Colon cancer Neg Hx     Ovarian cancer Neg Hx         Review of Systems   Constitutional:  Negative for fatigue.  "  HENT:  Negative for sore throat.    Respiratory:  Negative for cough and shortness of breath.    Cardiovascular:  Negative for chest pain, palpitations and leg swelling.   Gastrointestinal:  Negative for abdominal pain, constipation, diarrhea and nausea.   Genitourinary:  Negative for dysuria.   Musculoskeletal:  Negative for arthralgias and back pain.   Skin:  Negative for rash.   Neurological:  Negative for headaches.   Psychiatric/Behavioral:  Negative for dysphoric mood. The patient is not nervous/anxious.        Objective:  /70   Pulse 74   Ht 5' 1\" (1.549 m)   Wt 76.3 kg (168 lb 3.2 oz)   LMP 12/16/2024 (Exact Date)   SpO2 99%   BMI 31.78 kg/m²     Physical Exam  Vitals and nursing note reviewed.   Constitutional:       Appearance: Normal appearance. She is obese.   HENT:      Head: Normocephalic.   Pulmonary:      Effort: Pulmonary effort is normal.   Neurological:      General: No focal deficit present.      Mental Status: She is alert and oriented to person, place, and time.   Psychiatric:         Mood and Affect: Mood normal.         Behavior: Behavior normal.         Thought Content: Thought content normal.         Judgment: Judgment normal.              Labs and Imaging  Recent labs and imaging have been personally reviewed.  Lab Results   Component Value Date    WBC 8.18 09/17/2020    HGB 12.6 09/17/2020    HCT 37.9 09/17/2020    MCV 91 09/17/2020     09/17/2020     Lab Results   Component Value Date    SODIUM 140 09/17/2020    K 3.2 (L) 09/17/2020     09/17/2020    CO2 24 09/17/2020    AGAP 8 09/17/2020    BUN 19 09/17/2020    CREATININE 0.83 09/17/2020    GLUC 125 09/17/2020    CALCIUM 9.0 09/17/2020    EGFR 103 09/17/2020     Lab Results   Component Value Date    HGBA1C 5.1 05/06/2024     Lab Results   Component Value Date    OHD8UAQZTCDR 1.280 09/17/2020     No results found for: \"CHOLESTEROL\"  No results found for: \"HDL\"  No results found for: \"TRIG\"  No results found " "for: \"LDLCALC\"  "

## 2024-12-24 NOTE — ASSESSMENT & PLAN NOTE
- Discussed options of HealthyCORE-Intensive Lifestyle Intervention Program, Very Low Calorie Diet-VLCD, and Conservative Program and the role of weight loss medications.  - Patient is interested in pursuing Conservative Program and follow up visits with medical weight management provider.  - Explained the importance of making lifestyle changes in addition to starting any anti-obesity medications.   - Initial weight loss goal of 5-10% weight loss for improved health. Weight loss can improve patient's co-morbid conditions and/or prevent weight-related complications.  - Weight is not at goal and patient has been unable to achieve a meaningful weight loss above 5% using various programs and tools for more than 6 months  - Labs reviewed from 5/2024    General Recommendations:  Nutrition:  Eat breakfast daily.  Do not skip meals.      Food log (ie.) www.myfitnesspal.com, sparkpeople.com, loseit.com, calorieking.com, etc.     Practice mindful eating.  Be sure to set aside time to eat, eat slowly, and savor your food.     Hydration:    At least 64oz of water daily.  No sugar sweetened beverages.  No juice (eat the fruit instead).     Exercise:  Studies have shown that the ideal exercise goal is somewhere between 150 to 300 minutes of moderate intensity exercise a week.  Start with exercising 10 minutes every other day and gradually increase physical activity with a goal of at least 150 minutes of moderate intensity exercise a week, divided over at least 3 days a week.  An example of this would be exercising 30 minutes a day, 5 days a week.  Resistance training can increase muscle mass and increase our resting metabolic rate.   FULL BODY resistance training is recommended 2-3 times a week.  Do not do this on consecutive days to allow for muscle recovery.     Aim for a bare minimum 5000 steps, even on days you do not exercise.     Monitoring:   Weigh yourself daily.  If this causes undue stress, then just weigh yourself once  a week.  Weigh yourself the same time of the day with the same amount of clothing on.  Preferably this should be done after waking up, before you eat, and with no clothing or minimal clothing on.     Specific Goals:  Calorie goal:  6327-5022 bertha/day (Provided with meal plan to follow)   Patient lifestyle habits were reviewed and barriers to weight loss were addressed today.  Nutrition was discussed and patient was strongly encouraged to meet with the dietitian to better balance her nutrition and make sure she is meeting all of her nutrition requirements throughout the day.  She is to start tracking her food and measuring out her portions to see where her current nutrition stands.  She was advised to stay within the calorie range of 1000 to 1200 bertha/day and a protein goal of 70 to 80 g of protein daily.  Activity was discussed and she was encouraged to continue with her current exercise routine and consider some strength training.  Medications were discussed:  GLP-1 medications were discussed and may be considered in the future, patient will find out if she has coverage for these medications  Phentermine to be avoided due to anxiety  Topiramate was discussed to help with appetite and patient denies any history of kidney stones  Bupropion/naltrexone was discussed and patient is currently on bupropion for anxiety.  The dose of bupropion was increased to 150 mg twice daily and naltrexone was added to help with food cravings and emotional behaviors associated with eating.  She will meet with a dietitian and see if the combination of the talk therapy and bupropion/naltrexone will help get her weight back on track.  If not we will follow-up in the office in 2 to 3 months to evaluate if an injectable medication may be more appropriate.  Patient was in agreement with this treatment plan and will follow-up in the office.

## 2025-01-23 DIAGNOSIS — E66.09 CLASS 1 OBESITY DUE TO EXCESS CALORIES WITH SERIOUS COMORBIDITY AND BODY MASS INDEX (BMI) OF 31.0 TO 31.9 IN ADULT: ICD-10-CM

## 2025-01-23 DIAGNOSIS — E66.811 CLASS 1 OBESITY DUE TO EXCESS CALORIES WITH SERIOUS COMORBIDITY AND BODY MASS INDEX (BMI) OF 31.0 TO 31.9 IN ADULT: ICD-10-CM

## 2025-01-23 RX ORDER — BUPROPION HYDROCHLORIDE 150 MG/1
TABLET ORAL
Qty: 180 TABLET | Refills: 0 | Status: SHIPPED | OUTPATIENT
Start: 2025-01-23

## 2025-03-07 ENCOUNTER — OFFICE VISIT (OUTPATIENT)
Age: 25
End: 2025-03-07
Payer: COMMERCIAL

## 2025-03-07 VITALS
WEIGHT: 147.2 LBS | BODY MASS INDEX: 25.13 KG/M2 | HEIGHT: 64 IN | HEART RATE: 75 BPM | RESPIRATION RATE: 18 BRPM | DIASTOLIC BLOOD PRESSURE: 70 MMHG | OXYGEN SATURATION: 99 % | SYSTOLIC BLOOD PRESSURE: 110 MMHG

## 2025-03-07 DIAGNOSIS — R11.0 NAUSEA: ICD-10-CM

## 2025-03-07 DIAGNOSIS — E78.2 MIXED HYPERLIPIDEMIA: ICD-10-CM

## 2025-03-07 DIAGNOSIS — E66.09 CLASS 1 OBESITY DUE TO EXCESS CALORIES WITH SERIOUS COMORBIDITY AND BODY MASS INDEX (BMI) OF 31.0 TO 31.9 IN ADULT: ICD-10-CM

## 2025-03-07 DIAGNOSIS — F32.2 SEVERE MAJOR DEPRESSIVE DISORDER (HCC): ICD-10-CM

## 2025-03-07 DIAGNOSIS — F41.9 ANXIETY AND DEPRESSION: Primary | ICD-10-CM

## 2025-03-07 DIAGNOSIS — E66.811 CLASS 1 OBESITY DUE TO EXCESS CALORIES WITH SERIOUS COMORBIDITY AND BODY MASS INDEX (BMI) OF 31.0 TO 31.9 IN ADULT: ICD-10-CM

## 2025-03-07 DIAGNOSIS — F32.A ANXIETY AND DEPRESSION: Primary | ICD-10-CM

## 2025-03-07 PROCEDURE — 99214 OFFICE O/P EST MOD 30 MIN: CPT | Performed by: INTERNAL MEDICINE

## 2025-03-07 RX ORDER — HYDROXYZINE HYDROCHLORIDE 25 MG/1
25 TABLET, FILM COATED ORAL EVERY 6 HOURS PRN
Qty: 30 TABLET | Refills: 1 | Status: SHIPPED | OUTPATIENT
Start: 2025-03-07

## 2025-03-07 RX ORDER — ONDANSETRON 4 MG/1
4 TABLET, FILM COATED ORAL EVERY 8 HOURS PRN
Qty: 20 TABLET | Refills: 0 | Status: SHIPPED | OUTPATIENT
Start: 2025-03-07

## 2025-03-07 NOTE — ASSESSMENT & PLAN NOTE
Patient doing well with the Wellbutrin, Wellbutrin dose uptitrated by weight loss management, patient noticing increased benefits in terms of her mood and also weight loss.  Patient reports she does have breakthrough anxiety, BuSpar did not work for her.  Uptitration of Wellbutrin helped with her mood symptoms alone, will add as needed Atarax to help with symptoms.  Patient verbalized understanding of side effects, follow-up in 3 months    Orders:    hydrOXYzine HCL (ATARAX) 25 mg tablet; Take 1 tablet (25 mg total) by mouth every 6 (six) hours as needed for itching    CBC and differential; Future    Comprehensive metabolic panel; Future

## 2025-03-07 NOTE — PROGRESS NOTES
Name: Shelly Venegas      : 2000      MRN: [id number]  Encounter Provider: Alexandro Oviedo MD  Encounter Date: 3/7/2025   Encounter department: [facility] PRIMARY CARE  :  Assessment & Plan  Anxiety and depression  Patient doing well with the Wellbutrin, Wellbutrin dose uptitrated by weight loss management, patient noticing increased benefits in terms of her mood and also weight loss.  Patient reports she does have breakthrough anxiety, BuSpar did not work for her.  Uptitration of Wellbutrin helped with her mood symptoms alone, will add as needed Atarax to help with symptoms.  Patient verbalized understanding of side effects, follow-up in 3 months    Orders:    hydrOXYzine HCL (ATARAX) 25 mg tablet; Take 1 tablet (25 mg total) by mouth every 6 (six) hours as needed for itching    CBC and differential; Future    Comprehensive metabolic panel; Future    Severe major depressive disorder (HCC)  Continue Wellbutrin which has been recently uptitrated to maximum dose.  Patient tolerating well without any side effects    Orders:    CBC and differential; Future    Comprehensive metabolic panel; Future    Nausea  Episodic nausea as she is on a very low calorie diet.  Recommended Zofran as needed  Orders:    ondansetron (ZOFRAN) 4 mg tablet; Take 1 tablet (4 mg total) by mouth every 8 (eight) hours as needed for nausea or vomiting    CBC and differential; Future    Comprehensive metabolic panel; Future    Mixed hyperlipidemia  Diet controlled, anticipate labs to improve with recent weight loss, continue to monitor  Orders:    CBC and differential; Future    Comprehensive metabolic panel; Future    Lipid panel; Future    Class 1 obesity due to excess calories with serious comorbidity and body mass index (BMI) of 31.0 to 31.9 in adult    Patient has lost 20 pounds in the last 3 months with lifestyle modifications, establish care with bariatric, Wellbutrin has been uptitrated for additional weight loss  "benefits.  Continue to monitor discussed lifestyle modifications in detail.              History of Present Illness   HPI    Patient comes for follow-up of multiple chronic medical conditions as noted above.  Reports that her mood has improved, although struggles with breakthrough anxiety.  She was also able to lose 20 pound weight in the last 3 months with lifestyle modifications.  Denies subjective symptoms other than anxiety.  Review of Systems   Constitutional:  Negative for appetite change, chills, diaphoresis, fatigue, fever and unexpected weight change.   Respiratory:  Negative for apnea, cough, choking, chest tightness, shortness of breath, wheezing and stridor.    Cardiovascular:  Negative for chest pain, palpitations and leg swelling.   Gastrointestinal:  Negative for abdominal distention, abdominal pain, anal bleeding, blood in stool, constipation, diarrhea, nausea and vomiting.   Genitourinary:  Negative for decreased urine volume, difficulty urinating, frequency and urgency.   Musculoskeletal:  Negative for arthralgias, back pain and myalgias.   Neurological:  Negative for dizziness, light-headedness, numbness and headaches.   Psychiatric/Behavioral:  The patient is nervous/anxious.        Objective   /70 (BP Location: Right arm, Patient Position: Sitting, Cuff Size: Standard)   Pulse 75   Resp 18   Ht 5' 4\" (1.626 m)   Wt 66.8 kg (147 lb 3.2 oz)   SpO2 99%   BMI 25.27 kg/m²      Physical Exam  Constitutional:       General: She is not in acute distress.     Appearance: Normal appearance. She is normal weight. She is not ill-appearing, toxic-appearing or diaphoretic.   Cardiovascular:      Rate and Rhythm: Normal rate and regular rhythm.      Pulses: Normal pulses.      Heart sounds: Normal heart sounds. No murmur heard.     No gallop.   Pulmonary:      Effort: Pulmonary effort is normal. No respiratory distress.      Breath sounds: Normal breath sounds. No stridor. No wheezing, rhonchi or " rales.   Chest:      Chest wall: No tenderness.   Musculoskeletal:      Right lower leg: No edema.      Left lower leg: No edema.   Neurological:      Mental Status: She is alert and oriented to person, place, and time.

## 2025-03-07 NOTE — ASSESSMENT & PLAN NOTE
Patient has lost 20 pounds in the last 3 months with lifestyle modifications, establish care with bariatric, Wellbutrin has been uptitrated for additional weight loss benefits.  Continue to monitor discussed lifestyle modifications in detail.

## 2025-03-07 NOTE — ASSESSMENT & PLAN NOTE
Continue Wellbutrin which has been recently uptitrated to maximum dose.  Patient tolerating well without any side effects    Orders:    CBC and differential; Future    Comprehensive metabolic panel; Future

## 2025-05-09 ENCOUNTER — PATIENT MESSAGE (OUTPATIENT)
Age: 25
End: 2025-05-09

## 2025-05-09 DIAGNOSIS — F32.A ANXIETY AND DEPRESSION: ICD-10-CM

## 2025-05-09 DIAGNOSIS — E66.811 CLASS 1 OBESITY DUE TO EXCESS CALORIES WITH SERIOUS COMORBIDITY AND BODY MASS INDEX (BMI) OF 31.0 TO 31.9 IN ADULT: ICD-10-CM

## 2025-05-09 DIAGNOSIS — F41.9 ANXIETY AND DEPRESSION: ICD-10-CM

## 2025-05-09 DIAGNOSIS — E66.09 CLASS 1 OBESITY DUE TO EXCESS CALORIES WITH SERIOUS COMORBIDITY AND BODY MASS INDEX (BMI) OF 31.0 TO 31.9 IN ADULT: ICD-10-CM

## 2025-05-11 RX ORDER — HYDROXYZINE HYDROCHLORIDE 25 MG/1
25 TABLET, FILM COATED ORAL EVERY 6 HOURS PRN
Qty: 30 TABLET | Refills: 0 | Status: SHIPPED | OUTPATIENT
Start: 2025-05-11 | End: 2025-05-21

## 2025-05-12 NOTE — TELEPHONE ENCOUNTER
Called patient to get her scheduled to f/up with Doris RAMSAY, no answer Lmsg to contact thr office.

## 2025-05-13 RX ORDER — BUPROPION HYDROCHLORIDE 150 MG/1
TABLET ORAL
Qty: 180 TABLET | Refills: 0 | OUTPATIENT
Start: 2025-05-13

## 2025-05-16 ENCOUNTER — APPOINTMENT (OUTPATIENT)
Dept: LAB | Facility: CLINIC | Age: 25
End: 2025-05-16
Payer: COMMERCIAL

## 2025-05-16 ENCOUNTER — OFFICE VISIT (OUTPATIENT)
Dept: OBGYN CLINIC | Facility: CLINIC | Age: 25
End: 2025-05-16

## 2025-05-16 VITALS
SYSTOLIC BLOOD PRESSURE: 121 MMHG | DIASTOLIC BLOOD PRESSURE: 80 MMHG | HEIGHT: 64 IN | BODY MASS INDEX: 26.29 KG/M2 | HEART RATE: 98 BPM | WEIGHT: 154 LBS

## 2025-05-16 DIAGNOSIS — Z34.81 PRENATAL CARE, SUBSEQUENT PREGNANCY IN FIRST TRIMESTER: ICD-10-CM

## 2025-05-16 DIAGNOSIS — F41.9 ANXIETY AND DEPRESSION: ICD-10-CM

## 2025-05-16 DIAGNOSIS — R11.0 NAUSEA: ICD-10-CM

## 2025-05-16 DIAGNOSIS — O23.599 BACTERIAL VAGINOSIS IN PREGNANCY: ICD-10-CM

## 2025-05-16 DIAGNOSIS — F32.2 SEVERE MAJOR DEPRESSIVE DISORDER (HCC): ICD-10-CM

## 2025-05-16 DIAGNOSIS — Z32.01 PREGNANCY EXAMINATION OR TEST, POSITIVE RESULT: Primary | ICD-10-CM

## 2025-05-16 DIAGNOSIS — B96.89 BACTERIAL VAGINOSIS IN PREGNANCY: ICD-10-CM

## 2025-05-16 DIAGNOSIS — E78.2 MIXED HYPERLIPIDEMIA: ICD-10-CM

## 2025-05-16 DIAGNOSIS — Z3A.01 6 WEEKS GESTATION OF PREGNANCY: ICD-10-CM

## 2025-05-16 DIAGNOSIS — F32.A ANXIETY AND DEPRESSION: ICD-10-CM

## 2025-05-16 DIAGNOSIS — Z32.01 PREGNANCY EXAMINATION OR TEST, POSITIVE RESULT: ICD-10-CM

## 2025-05-16 LAB
ABO GROUP BLD: NORMAL
BACTERIA UR QL AUTO: ABNORMAL /HPF
BASOPHILS # BLD AUTO: 0.08 THOUSANDS/ÂΜL (ref 0–0.1)
BASOPHILS NFR BLD AUTO: 1 % (ref 0–1)
BILIRUB UR QL STRIP: NEGATIVE
BLD GP AB SCN SERPL QL: NEGATIVE
BV WHIFF TEST VAG QL: POSITIVE
CLARITY UR: CLEAR
CLUE CELLS SPEC QL WET PREP: POSITIVE
COLOR UR: ABNORMAL
EOSINOPHIL # BLD AUTO: 0.06 THOUSAND/ÂΜL (ref 0–0.61)
EOSINOPHIL NFR BLD AUTO: 1 % (ref 0–6)
ERYTHROCYTE [DISTWIDTH] IN BLOOD BY AUTOMATED COUNT: 12.7 % (ref 11.6–15.1)
GLUCOSE UR STRIP-MCNC: NEGATIVE MG/DL
HCT VFR BLD AUTO: 34.2 % (ref 34.8–46.1)
HGB BLD-MCNC: 11.8 G/DL (ref 11.5–15.4)
HGB UR QL STRIP.AUTO: NEGATIVE
IMM GRANULOCYTES # BLD AUTO: 0.03 THOUSAND/UL (ref 0–0.2)
IMM GRANULOCYTES NFR BLD AUTO: 0 % (ref 0–2)
KETONES UR STRIP-MCNC: NEGATIVE MG/DL
LEUKOCYTE ESTERASE UR QL STRIP: ABNORMAL
LYMPHOCYTES # BLD AUTO: 1.51 THOUSANDS/ÂΜL (ref 0.6–4.47)
LYMPHOCYTES NFR BLD AUTO: 15 % (ref 14–44)
MCH RBC QN AUTO: 30.8 PG (ref 26.8–34.3)
MCHC RBC AUTO-ENTMCNC: 34.5 G/DL (ref 31.4–37.4)
MCV RBC AUTO: 89 FL (ref 82–98)
MONOCYTES # BLD AUTO: 0.63 THOUSAND/ÂΜL (ref 0.17–1.22)
MONOCYTES NFR BLD AUTO: 6 % (ref 4–12)
NEUTROPHILS # BLD AUTO: 7.52 THOUSANDS/ÂΜL (ref 1.85–7.62)
NEUTS SEG NFR BLD AUTO: 77 % (ref 43–75)
NITRITE UR QL STRIP: NEGATIVE
NON-SQ EPI CELLS URNS QL MICRO: ABNORMAL /HPF
NRBC BLD AUTO-RTO: 0 /100 WBCS
PH UR STRIP.AUTO: 7.5 [PH]
PLATELET # BLD AUTO: 246 THOUSANDS/UL (ref 149–390)
PMV BLD AUTO: 11.2 FL (ref 8.9–12.7)
PROT UR STRIP-MCNC: NEGATIVE MG/DL
RBC # BLD AUTO: 3.83 MILLION/UL (ref 3.81–5.12)
RBC #/AREA URNS AUTO: ABNORMAL /HPF
RH BLD: POSITIVE
RUBV IGG SERPL IA-ACNC: 34.6 IU/ML
SL AMB POCT WET MOUNT: POSITIVE
SP GR UR STRIP.AUTO: 1.02 (ref 1–1.03)
SPECIMEN EXPIRATION DATE: NORMAL
UROBILINOGEN UR STRIP-ACNC: <2 MG/DL
WBC # BLD AUTO: 9.83 THOUSAND/UL (ref 4.31–10.16)
WBC #/AREA URNS AUTO: ABNORMAL /HPF

## 2025-05-16 PROCEDURE — 81001 URINALYSIS AUTO W/SCOPE: CPT

## 2025-05-16 PROCEDURE — 86803 HEPATITIS C AB TEST: CPT

## 2025-05-16 PROCEDURE — 36415 COLL VENOUS BLD VENIPUNCTURE: CPT

## 2025-05-16 PROCEDURE — 87591 N.GONORRHOEAE DNA AMP PROB: CPT

## 2025-05-16 PROCEDURE — 87086 URINE CULTURE/COLONY COUNT: CPT

## 2025-05-16 PROCEDURE — 87389 HIV-1 AG W/HIV-1&-2 AB AG IA: CPT

## 2025-05-16 PROCEDURE — 86706 HEP B SURFACE ANTIBODY: CPT

## 2025-05-16 PROCEDURE — 87491 CHLMYD TRACH DNA AMP PROBE: CPT

## 2025-05-16 PROCEDURE — 87340 HEPATITIS B SURFACE AG IA: CPT

## 2025-05-16 PROCEDURE — 85025 COMPLETE CBC W/AUTO DIFF WBC: CPT

## 2025-05-16 PROCEDURE — 86901 BLOOD TYPING SEROLOGIC RH(D): CPT

## 2025-05-16 PROCEDURE — 86900 BLOOD TYPING SEROLOGIC ABO: CPT

## 2025-05-16 PROCEDURE — 99214 OFFICE O/P EST MOD 30 MIN: CPT | Performed by: OBSTETRICS & GYNECOLOGY

## 2025-05-16 PROCEDURE — 86850 RBC ANTIBODY SCREEN: CPT

## 2025-05-16 PROCEDURE — 76817 TRANSVAGINAL US OBSTETRIC: CPT | Performed by: OBSTETRICS & GYNECOLOGY

## 2025-05-16 PROCEDURE — 86780 TREPONEMA PALLIDUM: CPT

## 2025-05-16 PROCEDURE — 86762 RUBELLA ANTIBODY: CPT

## 2025-05-16 PROCEDURE — 87210 SMEAR WET MOUNT SALINE/INK: CPT | Performed by: OBSTETRICS & GYNECOLOGY

## 2025-05-16 RX ORDER — METRONIDAZOLE 500 MG/1
500 TABLET ORAL 2 TIMES DAILY
Qty: 14 TABLET | Refills: 0 | Status: SHIPPED | OUTPATIENT
Start: 2025-05-16 | End: 2025-05-21

## 2025-05-16 RX ORDER — PNV NO.95/FERROUS FUM/FOLIC AC 28MG-0.8MG
1 TABLET ORAL DAILY
Qty: 30 TABLET | Refills: 3 | Status: SHIPPED | OUTPATIENT
Start: 2025-05-16

## 2025-05-16 NOTE — PROGRESS NOTES
Assessment/Plan:     6 weeks gestation of pregnancy  Overview:  Labs  GC/CT ordered  Prenatal panel ordered  MSAFP [  ]  Genetic screening [  ]  28w labs [  ]  Vaccines:  Flu vaccine: [ ]  Covid vaccine: [ ]  Tdap vaccine: [ ]  RSV vaccine: offer 32-36w  RhoGAM [  ]  Contraception: [ ]  Feeding plan: breast [ ]  Birth plan:  with epidural [ ]  Delivery consent: to be signed at 28w  Ultrasounds: [ ]        Diagnoses and all orders for this visit:    Pregnancy examination or test, positive result  -     HIV 1/2 AG/AB w Reflex [facility] for 2 yr old and above; Future  -     Hepatitis C antibody; Future  -     UA (URINE) with reflex to Scope; Future  -     Urine culture; Future  -     Hepatitis B surface antigen; Future  -     CBC and differential; Future  -     Rubella antibody, IgG; Future  -     Type and screen; Future  -     RPR-Syphilis Screening (Total Syphilis IGG/IGM); Future  -     Hepatitis B surface antibody; Future  -     Anemia Panel w/Reflex, OB; Future  -     Ambulatory Referral to Maternal Fetal Medicine; Future  -     AMB US OB < 14 weeks single or first gestation level 1    Prenatal care, subsequent pregnancy in first trimester  -     Chlamydia/GC amplified DNA by PCR; Future  -     Prenatal Vit-Fe Fumarate-FA (Prenatal Vitamin) 27-0.8 MG TABS; Take 1 tablet by mouth in the morning  -     Ambulatory Referral to Maternal Fetal Medicine; Future    6 weeks gestation of pregnancy  -     Prenatal Vit-Fe Fumarate-FA (Prenatal Vitamin) 27-0.8 MG TABS; Take 1 tablet by mouth in the morning  -     Ambulatory Referral to Maternal Fetal Medicine; Future    Bacterial vaginosis in pregnancy  -     metroNIDAZOLE (FLAGYL) 500 mg tablet; Take 1 tablet (500 mg total) by mouth 2 (two) times a day for 7 days  -     POCT wet mount    RTO for OB intake.           Subjective:      Patient ID: Shelly Venegas is a 25 y.o. female who  presents for dating and viability US.  Her LMP was0 3/29/25, c/w 6 wk 6 days, c/w 2025.   "She is doing well, she was seen today also for vaginal discharge, see separate note.     See US report.         HPI    The following portions of the patient's history were reviewed and updated as appropriate: allergies, current medications, past family history, past medical history, past social history, past surgical history and problem list.    Review of Systems      Objective:      /80 (BP Location: Left arm, Patient Position: Sitting, Cuff Size: Adult)   Pulse 98   Ht 5' 4\" (1.626 m)   Wt 69.9 kg (154 lb)   LMP 03/29/2025 (Exact Date)   BMI 26.43 kg/m²          Physical Exam  Vitals and nursing note reviewed. Exam conducted with a chaperone present.   Constitutional:       Appearance: Normal appearance.   Pulmonary:      Effort: Pulmonary effort is normal.     Neurological:      General: No focal deficit present.      Mental Status: She is alert and oriented to person, place, and time.     Psychiatric:         Mood and Affect: Mood normal.         Behavior: Behavior normal.           "
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
## 2025-05-16 NOTE — ASSESSMENT & PLAN NOTE
Overview:  Labs  GC/CT ordered  Prenatal panel ordered  MSAFP [  ]  Genetic screening [  ]  28w labs [  ]  Vaccines:  Flu vaccine: [ ]  Covid vaccine: [ ]  Tdap vaccine: [ ]  RSV vaccine: offer 32-36w  RhoGAM [  ]  Contraception: [ ]  Feeding plan: breast [ ]  Birth plan:  with epidural [ ]  Delivery consent: to be signed at 28w  Ultrasounds: [ ]

## 2025-05-17 LAB
BACTERIA UR CULT: NORMAL
C TRACH DNA SPEC QL NAA+PROBE: NEGATIVE
HBV SURFACE AB SER-ACNC: 21.7 MIU/ML
HBV SURFACE AG SER QL: NORMAL
HCV AB SER QL: NORMAL
HIV 1+2 AB+HIV1 P24 AG SERPL QL IA: NORMAL
N GONORRHOEA DNA SPEC QL NAA+PROBE: NEGATIVE
TREPONEMA PALLIDUM IGG+IGM AB [PRESENCE] IN SERUM OR PLASMA BY IMMUNOASSAY: NORMAL

## 2025-05-21 ENCOUNTER — OFFICE VISIT (OUTPATIENT)
Age: 25
End: 2025-05-21
Payer: COMMERCIAL

## 2025-05-21 VITALS
HEART RATE: 73 BPM | DIASTOLIC BLOOD PRESSURE: 80 MMHG | RESPIRATION RATE: 18 BRPM | SYSTOLIC BLOOD PRESSURE: 110 MMHG | WEIGHT: 152.8 LBS | OXYGEN SATURATION: 99 % | TEMPERATURE: 97.3 F | BODY MASS INDEX: 26.23 KG/M2

## 2025-05-21 DIAGNOSIS — F41.9 ANXIETY AND DEPRESSION: ICD-10-CM

## 2025-05-21 DIAGNOSIS — O23.599 BACTERIAL VAGINOSIS IN PREGNANCY: ICD-10-CM

## 2025-05-21 DIAGNOSIS — Z11.1 SCREENING FOR TUBERCULOSIS: ICD-10-CM

## 2025-05-21 DIAGNOSIS — B96.89 BACTERIAL VAGINOSIS IN PREGNANCY: ICD-10-CM

## 2025-05-21 DIAGNOSIS — F32.A ANXIETY AND DEPRESSION: ICD-10-CM

## 2025-05-21 DIAGNOSIS — Z3A.01 6 WEEKS GESTATION OF PREGNANCY: Primary | ICD-10-CM

## 2025-05-21 PROCEDURE — 99213 OFFICE O/P EST LOW 20 MIN: CPT | Performed by: INTERNAL MEDICINE

## 2025-05-21 PROCEDURE — 86580 TB INTRADERMAL TEST: CPT | Performed by: INTERNAL MEDICINE

## 2025-05-21 NOTE — PROGRESS NOTES
Adult Annual Physical  Name: Shelly Venegas      : 2000      MRN: [id number]  Encounter Provider: Alexandro Oviedo MD  Encounter Date: 2025   Encounter department: [facility] PRIMARY CARE    :  Assessment & Plan            Immunizations:  - Immunizations due: Tdap         History of Present Illness     Adult Annual Physical:  Patient presents for annual physical.     Diet and Physical Activity:  - Diet/Nutrition: well balanced diet.  - Exercise: walking, 3-4 times a week on average and 1-2 hours on average.    Depression Screening:    - PHQ-9 Score: 0    General Health:  - Sleep: sleeps well and 4-6 hours of sleep on average.  - Hearing: normal hearing bilateral ears.  - Vision: no vision problems and most recent eye exam > 1 year ago.  - Dental: regular dental visits, brushes teeth twice daily and floss regularly.    /GYN Health:  - Follows with GYN: yes.   - History of STDs: no    Review of Systems      Objective   /80 (BP Location: Left arm, Patient Position: Sitting, Cuff Size: Standard)   Pulse 73   Temp (!) 97.3 °F (36.3 °C) (Tympanic)   Resp 18   Wt 69.3 kg (152 lb 12.8 oz)   LMP 2025 (Exact Date)   SpO2 99%   BMI 26.23 kg/m²     Physical Exam

## 2025-05-21 NOTE — PATIENT INSTRUCTIONS
"Patient Education     Routine physical for adults   The Basics   Written by the doctors and editors at [facility]   What is a physical? -- A physical is a routine visit, or \"check-up,\" with your doctor. You might also hear it called a \"wellness visit\" or \"preventive visit.\"  During each visit, the doctor will:   Ask about your physical and mental health   Ask about your habits, behaviors, and lifestyle   Do an exam   Give you vaccines if needed   Talk to you about any medicines you take   Give advice about your health   Answer your questions  Getting regular check-ups is an important part of taking care of your health. It can help your doctor find and treat any problems you have. But it's also important for preventing health problems.  A routine physical is different from a \"sick visit.\" A sick visit is when you see a doctor because of a health concern or problem. Since physicals are scheduled ahead of time, you can think about what you want to ask the doctor.  How often should I get a physical? -- It depends on your age and health. In general, for people age 21 years and older:   If you are younger than 50 years, you might be able to get a physical every 3 years.   If you are 50 years or older, your doctor might recommend a physical every year.  If you have an ongoing health condition, like diabetes or high blood pressure, your doctor will probably want to see you more often.  What happens during a physical? -- In general, each visit will include:   Physical exam - The doctor or nurse will check your height, weight, heart rate, and blood pressure. They will also look at your eyes and ears. They will ask about how you are feeling and whether you have any symptoms that bother you.   Medicines - It's a good idea to bring a list of all the medicines you take to each doctor visit. Your doctor will talk to you about your medicines and answer any questions. Tell them if you are having any side effects that bother you. You " "should also tell them if you are having trouble paying for any of your medicines.   Habits and behaviors - This includes:   Your diet   Your exercise habits   Whether you smoke, drink alcohol, or use drugs   Whether you are sexually active   Whether you feel safe at home  Your doctor will talk to you about things you can do to improve your health and lower your risk of health problems. They will also offer help and support. For example, if you want to quit smoking, they can give you advice and might prescribe medicines. If you want to improve your diet or get more physical activity, they can help you with this, too.   Lab tests, if needed - The tests you get will depend on your age and situation. For example, your doctor might want to check your:   Cholesterol   Blood sugar   Iron level   Vaccines - The recommended vaccines will depend on your age, health, and what vaccines you already had. Vaccines are very important because they can prevent certain serious or deadly infections.   Discussion of screening - \"Screening\" means checking for diseases or other health problems before they cause symptoms. Your doctor can recommend screening based on your age, risk, and preferences. This might include tests to check for:   Cancer, such as breast, prostate, cervical, ovarian, colorectal, prostate, lung, or skin cancer   Sexually transmitted infections, such as chlamydia and gonorrhea   Mental health conditions like depression and anxiety  Your doctor will talk to you about the different types of screening tests. They can help you decide which screenings to have. They can also explain what the results might mean.   Answering questions - The physical is a good time to ask the doctor or nurse questions about your health. If needed, they can refer you to other doctors or specialists, too.  Adults older than 65 years often need other care, too. As you get older, your doctor will talk to you about:   How to prevent falling at " home   Hearing or vision tests   Memory testing   How to take your medicines safely   Making sure that you have the help and support you need at home  All topics are updated as new evidence becomes available and our peer review process is complete.  This topic retrieved from [employer] on: May 02, 2024.  Topic 504457 Version 1.0  Release: 32.4.3 - C32.122  © 2024 UpToDate, Inc. and/or its affiliates. All rights reserved.  Consumer Information Use and Disclaimer   Disclaimer: This generalized information is a limited summary of diagnosis, treatment, and/or medication information. It is not meant to be comprehensive and should be used as a tool to help the user understand and/or assess potential diagnostic and treatment options. It does NOT include all information about conditions, treatments, medications, side effects, or risks that may apply to a specific patient. It is not intended to be medical advice or a substitute for the medical advice, diagnosis, or treatment of a health care provider based on the health care provider's examination and assessment of a patient's specific and unique circumstances. Patients must speak with a health care provider for complete information about their health, medical questions, and treatment options, including any risks or benefits regarding use of medications. This information does not endorse any treatments or medications as safe, effective, or approved for treating a specific patient. UpToDate, Inc. and its affiliates disclaim any warranty or liability relating to this information or the use thereof.The use of this information is governed by the Terms of Use, available at https://www.[employer].com/en/know/clinical-effectiveness-terms. 2024© UpToDate, Inc. and its affiliates and/or licensors. All rights reserved.  Copyright   © 2024 UpToDate, Inc. and/or its affiliates. All rights reserved.

## 2025-05-21 NOTE — PROGRESS NOTES
Name: Shelly Venegas      : 2000      MRN: [id number]  Encounter Provider: Alexandro Oviedo MD  Encounter Date: 2025   Encounter department: [facility] PRIMARY CARE  :  Assessment & Plan  6 weeks gestation of pregnancy         Bacterial vaginosis in pregnancy         Screening for tuberculosis    Orders:    TB Skin Test    Anxiety and depression  Patient reports symptom improvement.  She has stopped Atarax BuSpar and Wellbutrin when she found that she was pregnant.  Patient wants to defer taking any medications at this time, and will reach out to us if she has low mood symptoms.            History of Present Illness   HPI    Pt requests pre employment physical and TB testing for screening for pulmonary TB.  She is currently 6 weeks pregnant.    Past Medical History   Past Medical History:   Diagnosis Date    Anxiety     Depression     Heart murmur      Past Surgical History:   Procedure Laterality Date    EYE SURGERY      stye removal     Family History   Problem Relation Age of Onset    Depression Mother     No Known Problems Father     No Known Problems Sister     Diabetes type I Sister     No Known Problems Brother     No Known Problems Brother     Asthma Brother     No Known Problems Maternal Grandmother     No Known Problems Maternal Grandfather     No Known Problems Paternal Grandmother     No Known Problems Paternal Grandfather     Breast cancer Neg Hx     Colon cancer Neg Hx     Ovarian cancer Neg Hx       reports that she has never smoked. She has been exposed to tobacco smoke. She has never used smokeless tobacco. She reports that she does not currently use alcohol. She reports that she does not use drugs.  Current Outpatient Medications   Medication Instructions    EPINEPHrine (EPIPEN) 0.3 mg, Intramuscular, Once    ondansetron (ZOFRAN) 4 mg, Oral, Every 8 hours PRN    Prenatal Vit-Fe Fumarate-FA (Prenatal Vitamin) 27-0.8 MG TABS 1 tablet, Oral, Daily   Allergies[1]   Review of  Systems   Constitutional:  Negative for appetite change, chills, diaphoresis, fatigue, fever and unexpected weight change.   Respiratory:  Negative for apnea, cough, choking, chest tightness, shortness of breath, wheezing and stridor.    Cardiovascular:  Negative for chest pain, palpitations and leg swelling.   Gastrointestinal:  Negative for abdominal distention, abdominal pain, anal bleeding, blood in stool, constipation, diarrhea, nausea and vomiting.   Genitourinary:  Negative for decreased urine volume, difficulty urinating, frequency and urgency.   Musculoskeletal:  Negative for arthralgias, back pain and myalgias.   Neurological:  Negative for dizziness, light-headedness, numbness and headaches.       Objective   /80 (BP Location: Left arm, Patient Position: Sitting, Cuff Size: Standard)   Pulse 73   Temp (!) 97.3 °F (36.3 °C) (Tympanic)   Resp 18   Wt 69.3 kg (152 lb 12.8 oz)   LMP 03/29/2025 (Exact Date)   SpO2 99%   BMI 26.23 kg/m²      Physical Exam  Constitutional:       General: She is not in acute distress.     Appearance: Normal appearance. She is normal weight. She is not ill-appearing, toxic-appearing or diaphoretic.     Cardiovascular:      Rate and Rhythm: Normal rate and regular rhythm.      Pulses: Normal pulses.      Heart sounds: Normal heart sounds. No murmur heard.     No gallop.   Pulmonary:      Effort: Pulmonary effort is normal. No respiratory distress.      Breath sounds: Normal breath sounds. No stridor. No wheezing, rhonchi or rales.   Chest:      Chest wall: No tenderness.     Musculoskeletal:      Right lower leg: No edema.      Left lower leg: No edema.     Neurological:      Mental Status: She is alert and oriented to person, place, and time.              [1]   Allergies  Allergen Reactions    Shrimp (Diagnostic) - Food Allergy Lip Swelling

## 2025-05-21 NOTE — ASSESSMENT & PLAN NOTE
Patient reports symptom improvement.  She has stopped Atarax BuSpar and Wellbutrin when she found that she was pregnant.  Patient wants to defer taking any medications at this time, and will reach out to us if she has low mood symptoms.

## 2025-05-23 ENCOUNTER — HOSPITAL ENCOUNTER (EMERGENCY)
Facility: HOSPITAL | Age: 25
Discharge: HOME | End: 2025-05-24
Attending: EMERGENCY MEDICINE
Payer: COMMERCIAL

## 2025-05-23 ENCOUNTER — APPOINTMENT (EMERGENCY)
Dept: ULTRASOUND IMAGING | Facility: HOSPITAL | Age: 25
End: 2025-05-23
Payer: COMMERCIAL

## 2025-05-23 ENCOUNTER — TELEPHONE (OUTPATIENT)
Age: 25
End: 2025-05-23

## 2025-05-23 ENCOUNTER — CLINICAL SUPPORT (OUTPATIENT)
Age: 25
End: 2025-05-23

## 2025-05-23 VITALS
OXYGEN SATURATION: 99 % | DIASTOLIC BLOOD PRESSURE: 69 MMHG | TEMPERATURE: 99.1 F | HEART RATE: 78 BPM | RESPIRATION RATE: 16 BRPM | SYSTOLIC BLOOD PRESSURE: 140 MMHG

## 2025-05-23 DIAGNOSIS — O03.9 SPONTANEOUS MISCARRIAGE: Primary | ICD-10-CM

## 2025-05-23 DIAGNOSIS — Z11.1 ENCOUNTER FOR PPD SKIN TEST READING: Primary | ICD-10-CM

## 2025-05-23 LAB
ALBUMIN SERPL BCG-MCNC: 4.2 G/DL (ref 3.5–5)
ALP SERPL-CCNC: 39 U/L (ref 34–104)
ALT SERPL W P-5'-P-CCNC: 24 U/L (ref 7–52)
ANION GAP SERPL CALCULATED.3IONS-SCNC: 7 MMOL/L (ref 4–13)
AST SERPL W P-5'-P-CCNC: 19 U/L (ref 13–39)
B-HCG SERPL-ACNC: 7711.8 MIU/ML (ref 0–5)
BASOPHILS # BLD AUTO: 0.09 THOUSANDS/ÂΜL (ref 0–0.1)
BASOPHILS NFR BLD AUTO: 1 % (ref 0–1)
BILIRUB SERPL-MCNC: 0.3 MG/DL (ref 0.2–1)
BUN SERPL-MCNC: 11 MG/DL (ref 5–25)
CALCIUM SERPL-MCNC: 9.1 MG/DL (ref 8.4–10.2)
CHLORIDE SERPL-SCNC: 106 MMOL/L (ref 96–108)
CO2 SERPL-SCNC: 24 MMOL/L (ref 21–32)
CREAT SERPL-MCNC: 0.61 MG/DL (ref 0.6–1.3)
EOSINOPHIL # BLD AUTO: 0.21 THOUSAND/ÂΜL (ref 0–0.61)
EOSINOPHIL NFR BLD AUTO: 2 % (ref 0–6)
ERYTHROCYTE [DISTWIDTH] IN BLOOD BY AUTOMATED COUNT: 12.6 % (ref 11.6–15.1)
GFR SERPL CREATININE-BSD FRML MDRD: 127 ML/MIN/1.73SQ M
GLUCOSE SERPL-MCNC: 108 MG/DL (ref 65–140)
HCT VFR BLD AUTO: 36 % (ref 34.8–46.1)
HGB BLD-MCNC: 12.4 G/DL (ref 11.5–15.4)
IMM GRANULOCYTES # BLD AUTO: 0.04 THOUSAND/UL (ref 0–0.2)
IMM GRANULOCYTES NFR BLD AUTO: 0 % (ref 0–2)
INDURATION: 0 MM
LIPASE SERPL-CCNC: 9 U/L (ref 11–82)
LYMPHOCYTES # BLD AUTO: 2.7 THOUSANDS/ÂΜL (ref 0.6–4.47)
LYMPHOCYTES NFR BLD AUTO: 23 % (ref 14–44)
MCH RBC QN AUTO: 30.8 PG (ref 26.8–34.3)
MCHC RBC AUTO-ENTMCNC: 34.4 G/DL (ref 31.4–37.4)
MCV RBC AUTO: 90 FL (ref 82–98)
MONOCYTES # BLD AUTO: 0.98 THOUSAND/ÂΜL (ref 0.17–1.22)
MONOCYTES NFR BLD AUTO: 8 % (ref 4–12)
NEUTROPHILS # BLD AUTO: 7.99 THOUSANDS/ÂΜL (ref 1.85–7.62)
NEUTS SEG NFR BLD AUTO: 66 % (ref 43–75)
NRBC BLD AUTO-RTO: 0 /100 WBCS
PLATELET # BLD AUTO: 278 THOUSANDS/UL (ref 149–390)
PMV BLD AUTO: 10.3 FL (ref 8.9–12.7)
POTASSIUM SERPL-SCNC: 4 MMOL/L (ref 3.5–5.3)
PROT SERPL-MCNC: 6.6 G/DL (ref 6.4–8.4)
RBC # BLD AUTO: 4.02 MILLION/UL (ref 3.81–5.12)
SODIUM SERPL-SCNC: 137 MMOL/L (ref 135–147)
TB SKIN TEST: NEGATIVE
WBC # BLD AUTO: 12.01 THOUSAND/UL (ref 4.31–10.16)

## 2025-05-23 PROCEDURE — 83690 ASSAY OF LIPASE: CPT

## 2025-05-23 PROCEDURE — 76815 OB US LIMITED FETUS(S): CPT

## 2025-05-23 PROCEDURE — 85025 COMPLETE CBC W/AUTO DIFF WBC: CPT

## 2025-05-23 PROCEDURE — 84702 CHORIONIC GONADOTROPIN TEST: CPT

## 2025-05-23 PROCEDURE — 80053 COMPREHEN METABOLIC PANEL: CPT

## 2025-05-23 PROCEDURE — NC001 PR NO CHARGE

## 2025-05-23 PROCEDURE — 99285 EMERGENCY DEPT VISIT HI MDM: CPT | Performed by: EMERGENCY MEDICINE

## 2025-05-23 PROCEDURE — 36415 COLL VENOUS BLD VENIPUNCTURE: CPT

## 2025-05-23 PROCEDURE — 99284 EMERGENCY DEPT VISIT MOD MDM: CPT

## 2025-05-23 NOTE — TELEPHONE ENCOUNTER
Spoke with patient who was asking if she has to do NIPT testing or if the NT will be enough.  Advised screen blood work is not required, it's optional.  Explained both tests.  Pt verbalized understanding, no further questions or concerns at this time.

## 2025-05-24 NOTE — ED PROVIDER NOTES
Time reflects when diagnosis was documented in both MDM as applicable and the Disposition within this note       Time User Action Codes Description Comment    2025 11:59 PM White, Lisseth Add [O03.9] Miscarriage     2025 11:59 PM White, Lisseth Remove [O03.9] Miscarriage     2025 11:59 PM WhiteMartiny Add [O03.9] Spontaneous miscarriage           ED Disposition       ED Disposition   Discharge    Condition   Stable    Date/Time   Fri May 23, 2025 11:59 PM    Comment   Shelly Venegas discharge to home/self care.                   Assessment & Plan       Medical Decision Making  25-year-old female presents with vaginal bleeding in early pregnancy, she suspects that she passed tissue.  Differential diagnosis includes but is not limited to spontaneous , threatened miscarriage, implantation bleeding, subchronic hemorrhage.    Problems Addressed:  Spontaneous miscarriage: acute illness or injury    Amount and/or Complexity of Data Reviewed  External Data Reviewed: notes.     Details: From prior OB/GYN office visit reviewed by me.  This demonstrated an IUP on ultrasound performed in the office.  Labs: ordered.     Details: Labs ordered and independently interpreted by me, patient's beta hCG quant is 7700.  Review of medical records from last week demonstrate blood type a positive.  Radiology: ordered.     Details: Ultrasound ordered by me and interpret by radiology demonstrates no intrauterine gestation consistent with spontaneous .  Discussion of management or test interpretation with external provider(s): Case discussed with Dr. Norton on she recommends patient follow-up with OB/GYN in the office she will contact the office to arrange follow-up appointment.  They do not need further beta hCG quant    Risk  Risk Details: Patient informed of ultrasound findings concerning for spontaneous .  All questions answered to the best of my ability.  Patient's elevated white blood cell count may be  secondary to the acute stress of the .  Patient has remained hemodynamically stable in the department.  She was instructed to return to the emergency department should she develop increased pain or bleeding.        ED Course as of 25 0102   Fri May 23, 2025   2221 Patient blood type A+ from 2025       Medications - No data to display    ED Risk Strat Scores                    No data recorded                            History of Present Illness       Chief Complaint   Patient presents with    Vaginal Bleeding - Pregnant     Pt states approximately 8 weeks pregnant. States she has been spotting x 2 weeks but OB said that was normal, but started with heavy vaginal bleeding and pain today. States she thinks she is having a miscarriage.       Past Medical History[1]   Past Surgical History[2]   Family History[3]   Social History[4]   E-Cigarette/Vaping    E-Cigarette Use Never User       E-Cigarette/Vaping Substances    Nicotine No     THC No     CBD No     Flavoring No     Other No     Unknown No       I have reviewed and agree with the history as documented.     25-year-old female G2, P0 at approximately 8 weeks gestation presents for evaluation of vaginal bleeding.  Patient states that she has been spotting for the past 2 weeks.  Today she developed heavy bleeding and cramping and suspects that she passed tissue.  Patient states that her last normal menstrual period was .  She had an ultrasound 2 weeks ago that demonstrated an IUP with a normal fetal heart rate.  Patient states that the pelvic cramping has improved but she continues to have bleeding.  Patient describes bleeding as similar to the amount of bleeding she has with a menstrual period      History provided by:  Patient and medical records  Vaginal Bleeding  Quality:  Passed tissue and bright red  Severity:  Severe  Onset quality:  Sudden  Progression:  Unchanged  Chronicity:  New  Menstrual history:  Regular  Possible pregnancy:  yes    Context: spontaneously    Relieved by:  Nothing  Worsened by:  Nothing  Ineffective treatments:  None tried  Associated symptoms: abdominal pain    Associated symptoms: no dysuria, no fever, no nausea and no vaginal discharge    Risk factors: no bleeding disorder, no hx of ectopic pregnancy and no prior miscarriage        Review of Systems   Constitutional:  Negative for appetite change and fever.   Respiratory:  Negative for wheezing.    Cardiovascular:  Negative for chest pain.   Gastrointestinal:  Positive for abdominal pain. Negative for nausea.   Genitourinary:  Positive for pelvic pain and vaginal bleeding. Negative for dysuria and vaginal discharge.   All other systems reviewed and are negative.          Objective       ED Triage Vitals [05/23/25 2106]   Temperature Pulse Blood Pressure Respirations SpO2 Patient Position - Orthostatic VS   99.1 °F (37.3 °C) 78 140/69 16 99 % --      Temp Source Heart Rate Source BP Location FiO2 (%) Pain Score    Oral Monitor -- -- --      Vitals      Date and Time Temp Pulse SpO2 Resp BP Pain Score FACES Pain Rating User   05/23/25 2106 99.1 °F (37.3 °C) 78 99 % 16 140/69 -- -- [place]            Physical Exam  Constitutional:       Appearance: She is well-developed.   HENT:      Head: Normocephalic.      Nose: Nose normal.      Mouth/Throat:      Pharynx: No oropharyngeal exudate.     Eyes:      Conjunctiva/sclera: Conjunctivae normal.      Pupils: Pupils are equal, round, and reactive to light.       Cardiovascular:      Rate and Rhythm: Normal rate and regular rhythm.      Heart sounds: Normal heart sounds.   Pulmonary:      Effort: Pulmonary effort is normal.      Breath sounds: Normal breath sounds.   Abdominal:      General: Bowel sounds are normal. There is no distension.      Palpations: Abdomen is soft.      Tenderness: There is abdominal tenderness in the suprapubic area. There is no right CVA tenderness, left CVA tenderness or rebound.     Musculoskeletal:          General: No tenderness or deformity. Normal range of motion.      Cervical back: Normal range of motion and neck supple.   Lymphadenopathy:      Cervical: No cervical adenopathy.     Skin:     General: Skin is warm and dry.      Findings: No rash.     Neurological:      Mental Status: She is alert and oriented to person, place, and time.      Cranial Nerves: No cranial nerve deficit.      Sensory: No sensory deficit.      Motor: No abnormal muscle tone.      Coordination: Coordination normal.      Gait: Gait normal.      Deep Tendon Reflexes: Reflexes are normal and symmetric.     Psychiatric:         Attention and Perception: Attention normal.         Mood and Affect: Affect is tearful.         Behavior: Behavior normal.         Thought Content: Thought content normal.         Cognition and Memory: Cognition normal.         Judgment: Judgment normal.         Results Reviewed       Procedure Component Value Units Date/Time    hCG, quantitative, pregnancy [[id number]]  (Abnormal) Collected: 05/23/25 2111    Lab Status: Final result Specimen: Blood from Arm, Right Updated: 05/23/25 2214     HCG, Quant 7,711.8 mIU/mL     Narrative:       Expected Ranges:    HCG results between 5.0 and 25.0 mIU/mL may be indicative of early pregnancy but should be interpreted in light of the total clinical presentation.    HCG can rise to detectable levels in pauline and post menopausal women (0-11.6 mIU/mL).     Approximate               Approximate HCG  Gestation age          Concentration ( mIU/mL)  _____________          ______________________   Weeks                      HCG values  0.2-1                       5-50  1-2                           2-3                         100-5000  3-4                         500-52198  4-5                         1000-30649  5-6                         [id number]  6-8                         [id number]  8-12                        [id number]      Comprehensive metabolic panel  [[id number]] Collected: 05/23/25 2111    Lab Status: Final result Specimen: Blood from Arm, Right Updated: 05/23/25 2151     Sodium 137 mmol/L      Potassium 4.0 mmol/L      Chloride 106 mmol/L      CO2 24 mmol/L      ANION GAP 7 mmol/L      BUN 11 mg/dL      Creatinine 0.61 mg/dL      Glucose 108 mg/dL      Calcium 9.1 mg/dL      AST 19 U/L      ALT 24 U/L      Alkaline Phosphatase 39 U/L      Total Protein 6.6 g/dL      Albumin 4.2 g/dL      Total Bilirubin 0.30 mg/dL      eGFR 127 ml/min/1.73sq m     Narrative:      [employer] guidelines for Chronic Kidney Disease (CKD):     Stage 1 with normal or high GFR (GFR > 90 mL/min/1.73 square meters)    Stage 2 Mild CKD (GFR = 60-89 mL/min/1.73 square meters)    Stage 3A Moderate CKD (GFR = 45-59 mL/min/1.73 square meters)    Stage 3B Moderate CKD (GFR = 30-44 mL/min/1.73 square meters)    Stage 4 Severe CKD (GFR = 15-29 mL/min/1.73 square meters)    Stage 5 End Stage CKD (GFR <15 mL/min/1.73 square meters)  Note: GFR calculation is accurate only with a steady state creatinine    Lipase [[id number]]  (Abnormal) Collected: 05/23/25 2111    Lab Status: Final result Specimen: Blood from Arm, Right Updated: 05/23/25 2151     Lipase 9 u/L     CBC and differential [[id number]]  (Abnormal) Collected: 05/23/25 2111    Lab Status: Final result Specimen: Blood from Arm, Right Updated: 05/23/25 2123     WBC 12.01 Thousand/uL      RBC 4.02 Million/uL      Hemoglobin 12.4 g/dL      Hematocrit 36.0 %      MCV 90 fL      MCH 30.8 pg      MCHC 34.4 g/dL      RDW 12.6 %      MPV 10.3 fL      Platelets 278 Thousands/uL      nRBC 0 /100 WBCs      Segmented % 66 %      Immature Grans % 0 %      Lymphocytes % 23 %      Monocytes % 8 %      Eosinophils Relative 2 %      Basophils Relative 1 %      Absolute Neutrophils 7.99 Thousands/µL      Absolute Immature Grans 0.04 Thousand/uL      Absolute Lymphocytes 2.70 Thousands/µL      Absolute Monocytes 0.98 Thousand/µL       Eosinophils Absolute 0.21 Thousand/µL      Basophils Absolute 0.09 Thousands/µL             US OB pregnancy limited with transvaginal   Final Interpretation by Edvin Le DO ([id number])      No intrauterine gestation identified. Differential considerations include early intrauterine gestation, spontaneous  or ectopic pregnancy. Clinical correlation and correlation with serial quantitative bHCG measurements recommended.      Probable right ovarian corpus luteum, small left adnexal cyst, bilateral ovaries otherwise appear grossly unremarkable.      Other findings as above.            Workstation performed: [id number]             Procedures    ED Medication and Procedure Management   Prior to Admission Medications   Prescriptions Last Dose Informant Patient Reported? Taking?   EPINEPHrine (EPIPEN) 0.3 mg/0.3 mL SOAJ   No No   Sig: Inject 0.3 mL (0.3 mg total) into a muscle once for 1 dose   Prenatal Vit-Fe Fumarate-FA (Prenatal Vitamin) 27-0.8 MG TABS   No No   Sig: Take 1 tablet by mouth in the morning   ondansetron (ZOFRAN) 4 mg tablet  Self No No   Sig: Take 1 tablet (4 mg total) by mouth every 8 (eight) hours as needed for nausea or vomiting   Patient not taking: Reported on 2025      Facility-Administered Medications Last Administration Doses Remaining   EPINEPHrine (EPIPEN) injection 0.3 mg None recorded 1        Discharge Medication List as of 2025 12:03 AM        CONTINUE these medications which have NOT CHANGED    Details   EPINEPHrine (EPIPEN) 0.3 mg/0.3 mL SOAJ Inject 0.3 mL (0.3 mg total) into a muscle once for 1 dose, Starting Mon 2024, Normal      ondansetron (ZOFRAN) 4 mg tablet Take 1 tablet (4 mg total) by mouth every 8 (eight) hours as needed for nausea or vomiting, Starting Fri 3/7/2025, Normal      Prenatal Vit-Fe Fumarate-FA (Prenatal Vitamin) 27-0.8 MG TABS Take 1 tablet by mouth in the morning, Starting 2025, Normal           Outpatient Discharge  Orders   hCG, quantitative   Standing Status: Future Standing Exp. Date: 07/24/26     ED SEPSIS DOCUMENTATION   Time reflects when diagnosis was documented in both MDM as applicable and the Disposition within this note       Time User Action Codes Description Comment    5/23/2025 11:59 PM Lisseth Blandon Add [O03.9] Miscarriage     5/23/2025 11:59 PM Lisseth Blandon Remove [O03.9] Miscarriage     5/23/2025 11:59 PM Lisseth Blandon Add [O03.9] Spontaneous miscarriage                    [1]   Past Medical History:  Diagnosis Date    Anxiety     Depression     Heart murmur    [2]   Past Surgical History:  Procedure Laterality Date    EYE SURGERY      stye removal   [3]   Family History  Problem Relation Name Age of Onset    Depression Mother      No Known Problems Father      No Known Problems Sister      Diabetes type I Sister      No Known Problems Brother      No Known Problems Brother      Asthma Brother      No Known Problems Maternal Grandmother      No Known Problems Maternal Grandfather      No Known Problems Paternal Grandmother      No Known Problems Paternal Grandfather      Breast cancer Neg Hx      Colon cancer Neg Hx      Ovarian cancer Neg Hx     [4]   Social History  Tobacco Use    Smoking status: Never     Passive exposure: Past    Smokeless tobacco: Never   Vaping Use    Vaping status: Never Used   Substance Use Topics    Alcohol use: Not Currently     Comment: 2 x a month    Drug use: No        Lisseth Blandon DO  05/24/25 0102

## 2025-05-25 LAB — HOLD SPECIMEN: NORMAL

## 2025-05-28 ENCOUNTER — CLINICAL SUPPORT (OUTPATIENT)
Age: 25
End: 2025-05-28
Payer: COMMERCIAL

## 2025-05-28 DIAGNOSIS — Z11.1 SCREENING FOR TUBERCULOSIS: Primary | ICD-10-CM

## 2025-05-28 PROCEDURE — 86580 TB INTRADERMAL TEST: CPT

## 2025-05-30 ENCOUNTER — CLINICAL SUPPORT (OUTPATIENT)
Age: 25
End: 2025-05-30

## 2025-05-30 DIAGNOSIS — Z11.1 ENCOUNTER FOR PPD SKIN TEST READING: Primary | ICD-10-CM

## 2025-05-30 LAB
INDURATION: 0 MM
TB SKIN TEST: NEGATIVE

## 2025-05-30 PROCEDURE — NC001 PR NO CHARGE

## 2025-06-06 ENCOUNTER — TELEPHONE (OUTPATIENT)
Age: 25
End: 2025-06-06

## 2025-06-06 NOTE — TELEPHONE ENCOUNTER
Patient called the office stating her employer did not except the second ppd because its was earlier that 7 days it was scheduled. Meaning she got it done her 1st one 5/21 red 5/23 and then 5/28 read 5/30 but with that week it was actually early than 7 days which they stated in the sheet no earlier than 7 days.

## 2025-06-07 DIAGNOSIS — Z3A.01 6 WEEKS GESTATION OF PREGNANCY: ICD-10-CM

## 2025-06-07 DIAGNOSIS — Z34.81 PRENATAL CARE, SUBSEQUENT PREGNANCY IN FIRST TRIMESTER: ICD-10-CM

## 2025-06-07 RX ORDER — PRENATAL VIT/IRON FUM/FOLIC AC 27MG-0.8MG
1 TABLET ORAL EVERY MORNING
Qty: 90 TABLET | Refills: 2 | Status: SHIPPED | OUTPATIENT
Start: 2025-06-07

## 2025-06-09 ENCOUNTER — CLINICAL SUPPORT (OUTPATIENT)
Age: 25
End: 2025-06-09
Payer: COMMERCIAL

## 2025-06-09 DIAGNOSIS — Z11.1 SCREENING FOR TUBERCULOSIS: Primary | ICD-10-CM

## 2025-06-09 PROCEDURE — 86580 TB INTRADERMAL TEST: CPT

## 2025-06-11 ENCOUNTER — PATIENT MESSAGE (OUTPATIENT)
Age: 25
End: 2025-06-11

## 2025-06-11 ENCOUNTER — CLINICAL SUPPORT (OUTPATIENT)
Age: 25
End: 2025-06-11

## 2025-06-11 DIAGNOSIS — F32.A ANXIETY AND DEPRESSION: Primary | ICD-10-CM

## 2025-06-11 DIAGNOSIS — F41.9 ANXIETY AND DEPRESSION: Primary | ICD-10-CM

## 2025-06-11 DIAGNOSIS — Z11.1 ENCOUNTER FOR PPD SKIN TEST READING: Primary | ICD-10-CM

## 2025-06-11 PROBLEM — Z30.09 ENCOUNTER FOR COUNSELING REGARDING CONTRACEPTION: Status: RESOLVED | Noted: 2022-06-07 | Resolved: 2025-06-11

## 2025-06-11 PROBLEM — Z72.51 UNPROTECTED SEX: Status: RESOLVED | Noted: 2019-01-21 | Resolved: 2025-06-11

## 2025-06-11 PROBLEM — A74.9 CHLAMYDIA INFECTION: Status: RESOLVED | Noted: 2021-05-11 | Resolved: 2025-06-11

## 2025-06-11 PROBLEM — O03.9 SAB (SPONTANEOUS ABORTION): Status: ACTIVE | Noted: 2025-05-16

## 2025-06-11 PROBLEM — B37.31 VAGINAL CANDIDA: Status: RESOLVED | Noted: 2024-05-24 | Resolved: 2025-06-11

## 2025-06-11 PROBLEM — Z00.00 ANNUAL PHYSICAL EXAM: Status: RESOLVED | Noted: 2021-04-30 | Resolved: 2025-06-11

## 2025-06-11 RX ORDER — HYDROXYZINE HYDROCHLORIDE 25 MG/1
25 TABLET, FILM COATED ORAL DAILY PRN
Qty: 30 TABLET | Refills: 1 | Status: SHIPPED | OUTPATIENT
Start: 2025-06-11 | End: 2025-07-11

## 2025-06-11 NOTE — PROGRESS NOTES
"OB/GYN VISIT  Shelly Venegas  2025  11:08 AM    ASSESSMENT / PLAN:    Shelly Venegas is a 25 y.o.  female presenting for follow up s/p spontaneous miscarriage.    Spontaneous miscarriage:  IUP confirmed on 25 with CRL c/w LMP dated at 6w6d  Presented to the ED on 25 for bleeding where US showed no IUP  Bled for 1 week following SAB with no residual bleeding or cramping  Desires pregnancy - counseled she may try to conceive whenever she feels ready  Recommend starting PNV  Recommend follow up within the next 3 months for annual gyn visit with pap smear which is due    SUBJECTIVE:    Shelly Venegas is a 25 y.o.  female presenting for follow up s/p spontaneous miscarriage.  She had a viability scan on 25 which showed a viable IUP consistent with LMP dated at 6w6d.  She subsequently presented to the ED on 25 for vaginal bleeding where US showed no IUP, consistent with SAB.  She bled for 1 week after this and hasn't bled otherwise since.  Today, she has no residual cramping or bleeding.  She is tearful because while she wasn't trying to conceive per se, she was happy when she learned she was pregnant.  She wasn't using contraception (hasn't since her early 20s) and doesn't want to start today since she'd like to get pregnant again.  She was advised that she may try to conceive whenever she feels ready and encouraged to start daily PNV.  She was also advised to follow up within the next 3 months for annual gyn visit with pap smear.    Past Medical History[1]    Past Surgical History[2]    OBJECTIVE:    Vitals:  Blood pressure 124/84, pulse 82, height 5' 4\" (1.626 m), weight 70.3 kg (155 lb), last menstrual period 2025, not currently breastfeeding.Body mass index is 26.61 kg/m².    Physical Exam:    Physical Exam  Constitutional:       General: She is not in acute distress.     Appearance: Normal appearance. She is not ill-appearing.   HENT:      Mouth/Throat:      Mouth: Mucous " membranes are moist.     Eyes:      Extraocular Movements: Extraocular movements intact.       Cardiovascular:      Rate and Rhythm: Normal rate.   Pulmonary:      Effort: Pulmonary effort is normal.     Musculoskeletal:         General: No swelling or tenderness.      Right lower leg: No edema.      Left lower leg: No edema.     Neurological:      General: No focal deficit present.      Mental Status: She is alert.     Skin:     General: Skin is warm and dry.      Coloration: Skin is not jaundiced or pale.     Psychiatric:         Mood and Affect: Mood normal.         Behavior: Behavior normal.         Thought Content: Thought content normal.         Judgment: Judgment normal.           Kristin Alanis MD  6/12/2025  11:08 AM         [1]   Past Medical History:  Diagnosis Date    Anxiety     Chlamydia infection 05/11/2021    Depression     Heart murmur    [2]   Past Surgical History:  Procedure Laterality Date    EYE SURGERY      stye removal

## 2025-06-12 ENCOUNTER — OFFICE VISIT (OUTPATIENT)
Dept: OBGYN CLINIC | Facility: CLINIC | Age: 25
End: 2025-06-12

## 2025-06-12 VITALS
WEIGHT: 155 LBS | SYSTOLIC BLOOD PRESSURE: 124 MMHG | HEART RATE: 82 BPM | BODY MASS INDEX: 26.46 KG/M2 | DIASTOLIC BLOOD PRESSURE: 84 MMHG | HEIGHT: 64 IN

## 2025-06-12 DIAGNOSIS — O03.9 SAB (SPONTANEOUS ABORTION): Primary | ICD-10-CM

## 2025-06-12 PROCEDURE — 99214 OFFICE O/P EST MOD 30 MIN: CPT | Performed by: OBSTETRICS & GYNECOLOGY

## 2025-06-13 LAB
INDURATION: 0 MM
TB SKIN TEST: NEGATIVE

## 2025-07-04 DIAGNOSIS — F41.9 ANXIETY AND DEPRESSION: ICD-10-CM

## 2025-07-04 DIAGNOSIS — F32.A ANXIETY AND DEPRESSION: ICD-10-CM

## 2025-07-06 RX ORDER — HYDROXYZINE HYDROCHLORIDE 25 MG/1
25 TABLET, FILM COATED ORAL DAILY PRN
Qty: 90 TABLET | Refills: 1 | Status: SHIPPED | OUTPATIENT
Start: 2025-07-06 | End: 2025-08-05